# Patient Record
Sex: MALE | Race: WHITE | HISPANIC OR LATINO
[De-identification: names, ages, dates, MRNs, and addresses within clinical notes are randomized per-mention and may not be internally consistent; named-entity substitution may affect disease eponyms.]

---

## 2018-02-02 VITALS
HEART RATE: 77 BPM | DIASTOLIC BLOOD PRESSURE: 64 MMHG | HEIGHT: 76 IN | WEIGHT: 275.8 LBS | SYSTOLIC BLOOD PRESSURE: 124 MMHG | OXYGEN SATURATION: 96 % | RESPIRATION RATE: 18 BRPM | TEMPERATURE: 97 F

## 2018-02-02 NOTE — PATIENT PROFILE ADULT. - PMH
Hyperlipidemia  Hyperlipidemia  Hypokalemia  Hypokalemia  Jaundice  Jaundice  Liver mass    Malignant neoplasm of pancreas  obstructive  Obesity  Obesity  Type 2 diabetes mellitus  Diabetes  Vitamin D deficiency  Vitamin D deficiency Hyperlipidemia  Hyperlipidemia  Hypokalemia  Hypokalemia  Jaundice  Jaundice  Liver mass    Malignant neoplasm of pancreas  obstructive  Obesity  Obesity  Vitamin D deficiency  Vitamin D deficiency Hyperlipidemia  Hyperlipidemia  Hypokalemia  Hypokalemia  Jaundice  Jaundice  Liver mass    Malignant neoplasm of pancreas  obstructive  Obesity  Obesity  Type 2 diabetes mellitus    Vitamin D deficiency  Vitamin D deficiency

## 2018-02-05 ENCOUNTER — RESULT REVIEW (OUTPATIENT)
Age: 58
End: 2018-02-05

## 2018-02-05 ENCOUNTER — OUTPATIENT (OUTPATIENT)
Dept: INPATIENT UNIT | Facility: HOSPITAL | Age: 58
LOS: 1 days | Discharge: HOME | End: 2018-02-05
Payer: MEDICARE

## 2018-02-05 LAB
ALBUMIN SERPL ELPH-MCNC: 3.9 G/DL — SIGNIFICANT CHANGE UP (ref 3.3–5)
ALP SERPL-CCNC: 72 U/L — SIGNIFICANT CHANGE UP (ref 40–120)
ALT FLD-CCNC: 38 U/L — SIGNIFICANT CHANGE UP (ref 10–45)
ANION GAP SERPL CALC-SCNC: 11 MMOL/L — SIGNIFICANT CHANGE UP (ref 5–17)
AST SERPL-CCNC: 49 U/L — HIGH (ref 10–40)
BASE EXCESS BLDA CALC-SCNC: -2.8 MMOL/L — LOW (ref -2–3)
BILIRUB SERPL-MCNC: 0.6 MG/DL — SIGNIFICANT CHANGE UP (ref 0.2–1.2)
BUN SERPL-MCNC: 14 MG/DL — SIGNIFICANT CHANGE UP (ref 7–23)
CA-I BLDA-SCNC: 1.1 MMOL/L — LOW (ref 1.12–1.3)
CALCIUM SERPL-MCNC: 8.8 MG/DL — SIGNIFICANT CHANGE UP (ref 8.4–10.5)
CHLORIDE SERPL-SCNC: 101 MMOL/L — SIGNIFICANT CHANGE UP (ref 96–108)
CO2 SERPL-SCNC: 23 MMOL/L — SIGNIFICANT CHANGE UP (ref 22–31)
COHGB MFR BLDA: 0.3 % — SIGNIFICANT CHANGE UP
CREAT SERPL-MCNC: 0.99 MG/DL — SIGNIFICANT CHANGE UP (ref 0.5–1.3)
GAS PNL BLDA: SIGNIFICANT CHANGE UP
GLUCOSE BLDC GLUCOMTR-MCNC: 189 MG/DL — HIGH (ref 70–99)
GLUCOSE BLDC GLUCOMTR-MCNC: 212 MG/DL — HIGH (ref 70–99)
GLUCOSE BLDC GLUCOMTR-MCNC: 217 MG/DL — HIGH (ref 70–99)
GLUCOSE BLDC GLUCOMTR-MCNC: 226 MG/DL — HIGH (ref 70–99)
GLUCOSE SERPL-MCNC: 211 MG/DL — HIGH (ref 70–99)
HCO3 BLDA-SCNC: 22 MMOL/L — SIGNIFICANT CHANGE UP (ref 21–28)
HCT VFR BLD CALC: 40.4 % — SIGNIFICANT CHANGE UP (ref 39–50)
HGB BLD-MCNC: 13.8 G/DL — SIGNIFICANT CHANGE UP (ref 13–17)
HGB BLDA-MCNC: 14.8 G/DL — SIGNIFICANT CHANGE UP (ref 13–17)
MAGNESIUM SERPL-MCNC: 1.9 MG/DL — SIGNIFICANT CHANGE UP (ref 1.6–2.6)
MCHC RBC-ENTMCNC: 30.5 PG — SIGNIFICANT CHANGE UP (ref 27–34)
MCHC RBC-ENTMCNC: 34.2 G/DL — SIGNIFICANT CHANGE UP (ref 32–36)
MCV RBC AUTO: 89.2 FL — SIGNIFICANT CHANGE UP (ref 80–100)
METHGB MFR BLDA: 0.2 % — SIGNIFICANT CHANGE UP
O2 CT VFR BLDA CALC: SIGNIFICANT CHANGE UP (ref 15–23)
OXYHGB MFR BLDA: 99 % — SIGNIFICANT CHANGE UP (ref 94–100)
PCO2 BLDA: 40 MMHG — SIGNIFICANT CHANGE UP (ref 35–48)
PH BLDA: 7.36 — SIGNIFICANT CHANGE UP (ref 7.35–7.45)
PHOSPHATE SERPL-MCNC: 2.9 MG/DL — SIGNIFICANT CHANGE UP (ref 2.5–4.5)
PLATELET # BLD AUTO: 210 K/UL — SIGNIFICANT CHANGE UP (ref 150–400)
PO2 BLDA: 298 MMHG — HIGH (ref 83–108)
POTASSIUM BLDA-SCNC: 5.6 MMOL/L — HIGH (ref 3.5–4.9)
POTASSIUM SERPL-MCNC: 5 MMOL/L — SIGNIFICANT CHANGE UP (ref 3.5–5.3)
POTASSIUM SERPL-SCNC: 5 MMOL/L — SIGNIFICANT CHANGE UP (ref 3.5–5.3)
PROT SERPL-MCNC: 7.4 G/DL — SIGNIFICANT CHANGE UP (ref 6–8.3)
RBC # BLD: 4.53 M/UL — SIGNIFICANT CHANGE UP (ref 4.2–5.8)
RBC # FLD: 13 % — SIGNIFICANT CHANGE UP (ref 10.3–16.9)
SAO2 % BLDA: 100 % — SIGNIFICANT CHANGE UP (ref 95–100)
SODIUM BLDA-SCNC: 136 MMOL/L — LOW (ref 138–146)
SODIUM SERPL-SCNC: 135 MMOL/L — SIGNIFICANT CHANGE UP (ref 135–145)
WBC # BLD: 12.5 K/UL — HIGH (ref 3.8–10.5)
WBC # FLD AUTO: 12.5 K/UL — HIGH (ref 3.8–10.5)

## 2018-02-05 RX ORDER — GLUCAGON INJECTION, SOLUTION 0.5 MG/.1ML
1 INJECTION, SOLUTION SUBCUTANEOUS ONCE
Qty: 0 | Refills: 0 | Status: DISCONTINUED | OUTPATIENT
Start: 2018-02-05 | End: 2018-02-06

## 2018-02-05 RX ORDER — DEXTROSE 50 % IN WATER 50 %
25 SYRINGE (ML) INTRAVENOUS ONCE
Qty: 0 | Refills: 0 | Status: DISCONTINUED | OUTPATIENT
Start: 2018-02-05 | End: 2018-02-06

## 2018-02-05 RX ORDER — OXYCODONE AND ACETAMINOPHEN 5; 325 MG/1; MG/1
2 TABLET ORAL EVERY 6 HOURS
Qty: 0 | Refills: 0 | Status: DISCONTINUED | OUTPATIENT
Start: 2018-02-05 | End: 2018-02-06

## 2018-02-05 RX ORDER — SODIUM CHLORIDE 9 MG/ML
1000 INJECTION, SOLUTION INTRAVENOUS
Qty: 0 | Refills: 0 | Status: DISCONTINUED | OUTPATIENT
Start: 2018-02-05 | End: 2018-02-06

## 2018-02-05 RX ORDER — HYDROMORPHONE HYDROCHLORIDE 2 MG/ML
0.5 INJECTION INTRAMUSCULAR; INTRAVENOUS; SUBCUTANEOUS ONCE
Qty: 0 | Refills: 0 | Status: DISCONTINUED | OUTPATIENT
Start: 2018-02-05 | End: 2018-02-05

## 2018-02-05 RX ORDER — DEXTROSE 50 % IN WATER 50 %
1 SYRINGE (ML) INTRAVENOUS ONCE
Qty: 0 | Refills: 0 | Status: DISCONTINUED | OUTPATIENT
Start: 2018-02-05 | End: 2018-02-06

## 2018-02-05 RX ORDER — DEXTROSE 50 % IN WATER 50 %
12.5 SYRINGE (ML) INTRAVENOUS ONCE
Qty: 0 | Refills: 0 | Status: DISCONTINUED | OUTPATIENT
Start: 2018-02-05 | End: 2018-02-06

## 2018-02-05 RX ORDER — DOCUSATE SODIUM 100 MG
1 CAPSULE ORAL
Qty: 60 | Refills: 0 | OUTPATIENT
Start: 2018-02-05

## 2018-02-05 RX ORDER — ONDANSETRON 8 MG/1
4 TABLET, FILM COATED ORAL EVERY 6 HOURS
Qty: 0 | Refills: 0 | Status: DISCONTINUED | OUTPATIENT
Start: 2018-02-05 | End: 2018-02-06

## 2018-02-05 RX ORDER — OXYCODONE AND ACETAMINOPHEN 5; 325 MG/1; MG/1
1 TABLET ORAL EVERY 4 HOURS
Qty: 0 | Refills: 0 | Status: DISCONTINUED | OUTPATIENT
Start: 2018-02-05 | End: 2018-02-06

## 2018-02-05 RX ORDER — METOCLOPRAMIDE HCL 10 MG
10 TABLET ORAL ONCE
Qty: 0 | Refills: 0 | Status: COMPLETED | OUTPATIENT
Start: 2018-02-05 | End: 2018-02-05

## 2018-02-05 RX ORDER — INSULIN LISPRO 100/ML
VIAL (ML) SUBCUTANEOUS
Qty: 0 | Refills: 0 | Status: DISCONTINUED | OUTPATIENT
Start: 2018-02-05 | End: 2018-02-06

## 2018-02-05 RX ORDER — OXYCODONE HYDROCHLORIDE 5 MG/1
1 TABLET ORAL
Qty: 30 | Refills: 0
Start: 2018-02-05

## 2018-02-05 RX ADMIN — ONDANSETRON 4 MILLIGRAM(S): 8 TABLET, FILM COATED ORAL at 16:51

## 2018-02-05 RX ADMIN — Medication 10 MILLIGRAM(S): at 21:43

## 2018-02-05 RX ADMIN — Medication 2: at 22:55

## 2018-02-05 NOTE — PROGRESS NOTE ADULT - SUBJECTIVE AND OBJECTIVE BOX
POST-OPERATIVE NOTE    Procedure: Microwave ablation of liver tumor    Diagnosis/Indication: liver tumor    Surgeon: Dr. Lubin    S: Pt has no complaints. Denies CP, SOB, CARRANZA, calf tenderness. Pain controlled with medication. Denies nausea, vomiting.    O:  T(C): 36.4 (02-05-18 @ 18:20), Max: 36.4 (02-05-18 @ 18:20)  T(F): 97.5 (02-05-18 @ 18:20), Max: 97.5 (02-05-18 @ 18:20)  HR: 57 (02-05-18 @ 18:20) (56 - 98)  BP: 145/81 (02-05-18 @ 18:20) (139/73 - 172/87)  RR: 17 (02-05-18 @ 18:20) (12 - 17)  SpO2: 94% (02-05-18 @ 18:20) (93% - 97%)  Wt(kg): --                        13.8   12.5  )-----------( 210      ( 05 Feb 2018 15:06 )             40.4     02-05    135  |  101  |  14  ----------------------------<  211<H>  5.0   |  23  |  0.99    Ca    8.8      05 Feb 2018 15:06  Phos  2.9     02-05  Mg     1.9     02-05    TPro  7.4  /  Alb  3.9  /  TBili  0.6  /  DBili  x   /  AST  49<H>  /  ALT  38  /  AlkPhos  72  02-05      Gen: NAD, resting comfortably in bed  C/V: NSR  Pulm: Nonlabored breathing, no respiratory distress  Abd: soft, NT/ND. Incision site is clean, dry and intact.  Extrem: WWP, no calf edema or tenderness, SCDs in place      A/P: 57y Male s/p above procedure  Diet: reg  IVF:   Pain/nausea control  SQH/SCDs/OOBA/IS  Dispo pending pain control, PO tolerance, clinical improvement

## 2018-02-05 NOTE — BRIEF OPERATIVE NOTE - OPERATION/FINDINGS
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

## 2018-02-06 VITALS
RESPIRATION RATE: 17 BRPM | DIASTOLIC BLOOD PRESSURE: 70 MMHG | SYSTOLIC BLOOD PRESSURE: 113 MMHG | TEMPERATURE: 98 F | OXYGEN SATURATION: 95 % | HEART RATE: 75 BPM

## 2018-02-06 LAB
ALBUMIN SERPL ELPH-MCNC: 3.5 G/DL — SIGNIFICANT CHANGE UP (ref 3.3–5)
ALP SERPL-CCNC: 66 U/L — SIGNIFICANT CHANGE UP (ref 40–120)
ALT FLD-CCNC: 70 U/L — HIGH (ref 10–45)
ANION GAP SERPL CALC-SCNC: 12 MMOL/L — SIGNIFICANT CHANGE UP (ref 5–17)
AST SERPL-CCNC: 85 U/L — HIGH (ref 10–40)
BASOPHILS NFR BLD AUTO: 0.1 % — SIGNIFICANT CHANGE UP (ref 0–2)
BILIRUB SERPL-MCNC: 0.9 MG/DL — SIGNIFICANT CHANGE UP (ref 0.2–1.2)
BUN SERPL-MCNC: 13 MG/DL — SIGNIFICANT CHANGE UP (ref 7–23)
CALCIUM SERPL-MCNC: 8.9 MG/DL — SIGNIFICANT CHANGE UP (ref 8.4–10.5)
CHLORIDE SERPL-SCNC: 102 MMOL/L — SIGNIFICANT CHANGE UP (ref 96–108)
CO2 SERPL-SCNC: 24 MMOL/L — SIGNIFICANT CHANGE UP (ref 22–31)
CREAT SERPL-MCNC: 0.76 MG/DL — SIGNIFICANT CHANGE UP (ref 0.5–1.3)
EOSINOPHIL NFR BLD AUTO: 0.2 % — SIGNIFICANT CHANGE UP (ref 0–6)
GLUCOSE BLDC GLUCOMTR-MCNC: 161 MG/DL — HIGH (ref 70–99)
GLUCOSE BLDC GLUCOMTR-MCNC: 173 MG/DL — HIGH (ref 70–99)
GLUCOSE SERPL-MCNC: 169 MG/DL — HIGH (ref 70–99)
HBA1C BLD-MCNC: 8.5 % — HIGH (ref 4–5.6)
HCT VFR BLD CALC: 37.8 % — LOW (ref 39–50)
HGB BLD-MCNC: 12.9 G/DL — LOW (ref 13–17)
LYMPHOCYTES # BLD AUTO: 15 % — SIGNIFICANT CHANGE UP (ref 13–44)
MAGNESIUM SERPL-MCNC: 1.9 MG/DL — SIGNIFICANT CHANGE UP (ref 1.6–2.6)
MCHC RBC-ENTMCNC: 30.2 PG — SIGNIFICANT CHANGE UP (ref 27–34)
MCHC RBC-ENTMCNC: 34.1 G/DL — SIGNIFICANT CHANGE UP (ref 32–36)
MCV RBC AUTO: 88.5 FL — SIGNIFICANT CHANGE UP (ref 80–100)
MONOCYTES NFR BLD AUTO: 6.4 % — SIGNIFICANT CHANGE UP (ref 2–14)
NEUTROPHILS NFR BLD AUTO: 78.3 % — HIGH (ref 43–77)
PHOSPHATE SERPL-MCNC: 2.9 MG/DL — SIGNIFICANT CHANGE UP (ref 2.5–4.5)
PLATELET # BLD AUTO: 180 K/UL — SIGNIFICANT CHANGE UP (ref 150–400)
POTASSIUM SERPL-MCNC: 4 MMOL/L — SIGNIFICANT CHANGE UP (ref 3.5–5.3)
POTASSIUM SERPL-SCNC: 4 MMOL/L — SIGNIFICANT CHANGE UP (ref 3.5–5.3)
PROT SERPL-MCNC: 6.6 G/DL — SIGNIFICANT CHANGE UP (ref 6–8.3)
RBC # BLD: 4.27 M/UL — SIGNIFICANT CHANGE UP (ref 4.2–5.8)
RBC # FLD: 13.1 % — SIGNIFICANT CHANGE UP (ref 10.3–16.9)
SODIUM SERPL-SCNC: 138 MMOL/L — SIGNIFICANT CHANGE UP (ref 135–145)
WBC # BLD: 12.4 K/UL — HIGH (ref 3.8–10.5)
WBC # FLD AUTO: 12.4 K/UL — HIGH (ref 3.8–10.5)

## 2018-02-06 RX ADMIN — Medication 1: at 07:51

## 2018-02-06 NOTE — DISCHARGE NOTE ADULT - CARE PROVIDER_API CALL
Roberto Lubin), Surgery  [address] [place] [address]  Phone: [phone]  Fax: [phone]

## 2018-02-06 NOTE — DISCHARGE NOTE ADULT - MEDICATION SUMMARY - MEDICATIONS TO TAKE
I will START or STAY ON the medications listed below when I get home from the hospital:    acetaminophen-oxyCODONE 325 mg-5 mg oral tablet  -- 1 tab(s) by mouth every 6 hours, As Needed -for severe pain MDD:8  -- Caution federal law prohibits the transfer of this drug to any person other  than the person for whom it was prescribed.  May cause drowsiness.  Alcohol may intensify this effect.  Use care when operating dangerous machinery.  This prescription cannot be refilled.  This product contains acetaminophen.  Do not use  with any other product containing acetaminophen to prevent possible liver damage.  Using more of this medication than prescribed may cause serious breathing problems.    -- Indication: For Post operative pain     docusate sodium 100 mg oral tablet  -- 1 tab(s) by mouth 2 times a day, As Needed MDD:2   for constipation  -- Medication should be taken with plenty of water.    -- Indication: For Post operative constipation

## 2018-02-06 NOTE — DISCHARGE NOTE ADULT - PLAN OF CARE
Post operative care and follow up General Discharge Instructions:  Please resume all regular home medications unless specifically advised not to take a particular medication. Also, please take any new medications as prescribed.  Please get plenty of rest, continue to ambulate several times per day, and drink adequate amounts of fluids. Avoid lifting weights greater than 5-10 lbs until you follow-up with your surgeon, who will instruct you further regarding activity restrictions.  Avoid driving or operating heavy machinery while taking pain medications.  Please follow-up with your surgeon and Primary Care Provider (PCP) as advised.  Incision Care:  *Please call your doctor or nurse practitioner if you have increased pain, swelling, redness, or drainage from the incision site.  *Avoid swimming and baths until your follow-up appointment.  *You may shower, and wash surgical incisions with a mild soap and warm water. Gently pat the area dry.  *If you have staples, they will be removed at your follow-up appointment.  *If you have steri-strips, they will fall off on their own. Please remove any remaining strips 7-10 days after surgery. Post operative care Warning Signs:  Please call your doctor or nurse practitioner if you experience the following:  *You experience new chest pain, pressure, squeezing or tightness.  *New or worsening cough, shortness of breath, or wheeze.  *If you are vomiting and cannot keep down fluids or your medications.  *You are getting dehydrated due to continued vomiting, diarrhea, or other reasons. Signs of dehydration include dry mouth, rapid heartbeat, or feeling dizzy or faint when standing.  *You see blood or dark/black material when you vomit or have a bowel movement.  *You experience burning when you urinate, have blood in your urine, or experience a discharge.  *Your pain is not improving within 8-12 hours or is not gone within 24 hours. Call or return immediately if your pain is getting worse, changes location, or moves to your chest or back.  *You have shaking chills, or fever greater than 101.5 degrees Fahrenheit or 38 degrees Celsius.  *Any change in your symptoms, or any new symptoms that concern you.

## 2018-02-06 NOTE — PROGRESS NOTE ADULT - ASSESSMENT
56 yo M s/p henrry lap, DELORIS, liver biopsy, microwave ablation    -Pain/nausea control  -Diet reg, IV   -Percocet PO (pt states he has no true allergy to drug)  -SCDs, OBBA, IS  -AM labs

## 2018-02-06 NOTE — DISCHARGE NOTE ADULT - HOSPITAL COURSE
Patient presented to same day surgery for microwave ablation of liver mass. Patient underwent diagnostic laparoscopy, lysis of adhesions, liver biopsy, and microwave ablation of liver without acute events. His postoperative course was unremarkable with advancement of diet, passing trial of void, and pain control. On day of discharge patient was stable to be d/c'd home.

## 2018-02-06 NOTE — DISCHARGE NOTE ADULT - CARE PLAN
Principal Discharge DX:	Liver mass  Goal:	Post operative care and follow up  Assessment and plan of treatment:	General Discharge Instructions:  Please resume all regular home medications unless specifically advised not to take a particular medication. Also, please take any new medications as prescribed.  Please get plenty of rest, continue to ambulate several times per day, and drink adequate amounts of fluids. Avoid lifting weights greater than 5-10 lbs until you follow-up with your surgeon, who will instruct you further regarding activity restrictions.  Avoid driving or operating heavy machinery while taking pain medications.  Please follow-up with your surgeon and Primary Care Provider (PCP) as advised.  Incision Care:  *Please call your doctor or nurse practitioner if you have increased pain, swelling, redness, or drainage from the incision site.  *Avoid swimming and baths until your follow-up appointment.  *You may shower, and wash surgical incisions with a mild soap and warm water. Gently pat the area dry.  *If you have staples, they will be removed at your follow-up appointment.  *If you have steri-strips, they will fall off on their own. Please remove any remaining strips 7-10 days after surgery.  Goal:	Post operative care  Assessment and plan of treatment:	Warning Signs:  Please call your doctor or nurse practitioner if you experience the following:  *You experience new chest pain, pressure, squeezing or tightness.  *New or worsening cough, shortness of breath, or wheeze.  *If you are vomiting and cannot keep down fluids or your medications.  *You are getting dehydrated due to continued vomiting, diarrhea, or other reasons. Signs of dehydration include dry mouth, rapid heartbeat, or feeling dizzy or faint when standing.  *You see blood or dark/black material when you vomit or have a bowel movement.  *You experience burning when you urinate, have blood in your urine, or experience a discharge.  *Your pain is not improving within 8-12 hours or is not gone within 24 hours. Call or return immediately if your pain is getting worse, changes location, or moves to your chest or back.  *You have shaking chills, or fever greater than 101.5 degrees Fahrenheit or 38 degrees Celsius.  *Any change in your symptoms, or any new symptoms that concern you.

## 2018-02-06 NOTE — DISCHARGE NOTE ADULT - PATIENT PORTAL LINK FT
You can access the [facility] Patient Portal, offered by [facility], by registering with the following website: [email or web address]

## 2018-02-06 NOTE — PROGRESS NOTE ADULT - SUBJECTIVE AND OBJECTIVE BOX
O/N: passed TOV. VSs. BROOK  2/5: s/p microwave ablation, keeping 23 hour obs however pt may stay additional day. Pt has DM however, pt takes no metformin or insulin at home. Let's monitor finger sticks, may need baby dose of insulin sliding scale. POC wnl O/N: passed TOV. VSs. BROOK  2/5: s/p microwave ablation, keeping 23 hour obs however pt may stay additional day. Pt has DM however, pt takes no metformin or insulin at home. Let's monitor finger sticks, may need baby dose of insulin sliding scale. POC wnl  OVERNIGHT EVENTS:    STATUS POST:      POST OPERATIVE DAY #:     SUBJECTIVE:  Flatus: [] YES [] NO             Bowel Movement: [ ] YES [ ] NO  Pain (0-10):            Pain Control Adequate: [ ] YES [ ] NO  Nausea: [ ] YES [ ] NO            Vomiting: [ ] YES [ ] NO  Diarrhea: [ ] YES [ ] NO         Constipation: [ ] YES [ ] NO     Chest Pain: [ ] YES [ ] NO    SOB:  [ ] YES [ ] NO        Vital Signs Last 24 Hrs  T(C): 36.6 (06 Feb 2018 05:34), Max: 37.2 (05 Feb 2018 14:50)  T(F): 97.8 (06 Feb 2018 05:34), Max: 98.9 (05 Feb 2018 14:50)  HR: 75 (06 Feb 2018 05:34) (56 - 98)  BP: 113/70 (06 Feb 2018 05:34) (113/70 - 174/86)  BP(mean): 97 (05 Feb 2018 17:00) (92 - 130)  RR: 17 (06 Feb 2018 05:34) (12 - 17)  SpO2: 95% (06 Feb 2018 05:34) (93% - 98%)  I&O's Detail    05 Feb 2018 07:01  -  06 Feb 2018 07:00  --------------------------------------------------------  IN:    lactated ringers.: 1200 mL    Other: 1700 mL  Total IN: 2900 mL    OUT:    Voided: 875 mL  Total OUT: 875 mL    Total NET: 2025 mL          General: NAD, resting comfortably in bed  C/V: NSR  Pulm: Nonlabored breathing, no respiratory distress  Abd: soft, non distended , TTP around incision note , incision clean dry and intact   Extrem: WWP, no edema, SCDs in place        LABS:                        13.8   12.5  )-----------( 210      ( 05 Feb 2018 15:06 )             40.4     02-05    135  |  101  |  14  ----------------------------<  211<H>  5.0   |  23  |  0.99    Ca    8.8      05 Feb 2018 15:06  Phos  2.9     02-05  Mg     1.9     02-05    TPro  7.4  /  Alb  3.9  /  TBili  0.6  /  DBili  x   /  AST  49<H>  /  ALT  38  /  AlkPhos  72  02-05

## 2018-02-07 LAB — SURGICAL PATHOLOGY STUDY: SIGNIFICANT CHANGE UP

## 2018-02-07 PROCEDURE — 84132 ASSAY OF SERUM POTASSIUM: CPT

## 2018-02-07 PROCEDURE — 84100 ASSAY OF PHOSPHORUS: CPT

## 2018-02-07 PROCEDURE — 80053 COMPREHEN METABOLIC PANEL: CPT

## 2018-02-07 PROCEDURE — 83735 ASSAY OF MAGNESIUM: CPT

## 2018-02-07 PROCEDURE — 84295 ASSAY OF SERUM SODIUM: CPT

## 2018-02-07 PROCEDURE — 36415 COLL VENOUS BLD VENIPUNCTURE: CPT

## 2018-02-07 PROCEDURE — 82962 GLUCOSE BLOOD TEST: CPT

## 2018-02-07 PROCEDURE — 85027 COMPLETE CBC AUTOMATED: CPT

## 2018-02-07 PROCEDURE — 88307 TISSUE EXAM BY PATHOLOGIST: CPT

## 2018-02-07 PROCEDURE — 86900 BLOOD TYPING SEROLOGIC ABO: CPT

## 2018-02-07 PROCEDURE — C1889: CPT

## 2018-02-07 PROCEDURE — 86901 BLOOD TYPING SEROLOGIC RH(D): CPT

## 2018-02-07 PROCEDURE — 82330 ASSAY OF CALCIUM: CPT

## 2018-02-07 PROCEDURE — 85018 HEMOGLOBIN: CPT

## 2018-02-07 PROCEDURE — 88331 PATH CONSLTJ SURG 1 BLK 1SPC: CPT

## 2018-02-07 PROCEDURE — 47370 LAPARO ABLATE LIVER TUMOR RF: CPT

## 2018-02-07 PROCEDURE — 86850 RBC ANTIBODY SCREEN: CPT

## 2018-08-16 PROBLEM — R16.0 HEPATOMEGALY, NOT ELSEWHERE CLASSIFIED: Chronic | Status: ACTIVE | Noted: 2018-02-02

## 2018-08-16 PROBLEM — E11.9 TYPE 2 DIABETES MELLITUS WITHOUT COMPLICATIONS: Chronic | Status: ACTIVE | Noted: 2018-02-05

## 2018-08-22 NOTE — PATIENT PROFILE ADULT. - PSH
Elective surgery for purposes other than treating health conditions  billary sphinecterotomyof common bile duct stricture with splint placement  Whipple Procedure  Other postprocedural status  History of tonsillectomy Elective surgery for purposes other than treating health conditions  billary sphinecterotomyof common bile duct stricture with splint placement  Whipple Procedure  Other postprocedural status  History of tonsillectomy  Surgery, elective  liver ablation, feb 2018 Elective surgery for purposes other than treating health conditions  billary sphinecterotomy of common bile duct stricture with splint placement  Whipple Procedure  Other postprocedural status  History of tonsillectomy  Surgery, elective  liver ablation, feb 2018

## 2018-08-22 NOTE — PATIENT PROFILE ADULT. - PMH
Hyperlipidemia  Hyperlipidemia  Hypokalemia  Hypokalemia  Jaundice  Jaundice  Liver mass    Malignant neoplasm of pancreas  obstructive  Obesity  Obesity  Type 2 diabetes mellitus    Vitamin D deficiency  Vitamin D deficiency

## 2018-08-23 ENCOUNTER — INPATIENT (INPATIENT)
Facility: HOSPITAL | Age: 58
LOS: 0 days | Discharge: HOME | DRG: 406 | End: 2018-08-24
Attending: SURGERY | Admitting: SURGERY
Payer: COMMERCIAL

## 2018-08-23 ENCOUNTER — RESULT REVIEW (OUTPATIENT)
Age: 58
End: 2018-08-23

## 2018-08-23 VITALS
HEART RATE: 67 BPM | TEMPERATURE: 97 F | SYSTOLIC BLOOD PRESSURE: 118 MMHG | DIASTOLIC BLOOD PRESSURE: 59 MMHG | HEIGHT: 75 IN | RESPIRATION RATE: 16 BRPM | OXYGEN SATURATION: 96 % | WEIGHT: 257.28 LBS

## 2018-08-23 DIAGNOSIS — Z41.9 ENCOUNTER FOR PROCEDURE FOR PURPOSES OTHER THAN REMEDYING HEALTH STATE, UNSPECIFIED: Chronic | ICD-10-CM

## 2018-08-23 LAB
BASE EXCESS BLDA CALC-SCNC: -5.6 MMOL/L — LOW (ref -2–3)
CA-I BLDA-SCNC: 1.11 MMOL/L — LOW (ref 1.12–1.3)
COHGB MFR BLDA: 0.7 % — SIGNIFICANT CHANGE UP
GAS PNL BLDA: SIGNIFICANT CHANGE UP
GLUCOSE BLDC GLUCOMTR-MCNC: 231 MG/DL — HIGH (ref 70–99)
GLUCOSE BLDC GLUCOMTR-MCNC: 237 MG/DL — HIGH (ref 70–99)
GLUCOSE BLDC GLUCOMTR-MCNC: 243 MG/DL — HIGH (ref 70–99)
GLUCOSE BLDC GLUCOMTR-MCNC: 249 MG/DL — HIGH (ref 70–99)
GLUCOSE BLDC GLUCOMTR-MCNC: 286 MG/DL — HIGH (ref 70–99)
HCO3 BLDA-SCNC: 20 MMOL/L — LOW (ref 21–28)
HGB BLDA-MCNC: 13.7 G/DL — SIGNIFICANT CHANGE UP (ref 13–17)
METHGB MFR BLDA: 0.4 % — SIGNIFICANT CHANGE UP
O2 CT VFR BLDA CALC: 18.9 ML/DL — SIGNIFICANT CHANGE UP (ref 15–23)
OXYHGB MFR BLDA: 97 % — SIGNIFICANT CHANGE UP (ref 94–100)
PCO2 BLDA: 42 MMHG — SIGNIFICANT CHANGE UP (ref 35–48)
PH BLDA: 7.31 — LOW (ref 7.35–7.45)
PO2 BLDA: 129 MMHG — HIGH (ref 83–108)
POTASSIUM BLDA-SCNC: 4.8 MMOL/L — SIGNIFICANT CHANGE UP (ref 3.5–4.9)
SAO2 % BLDA: 98 % — SIGNIFICANT CHANGE UP (ref 95–100)
SODIUM BLDA-SCNC: 134 MMOL/L — LOW (ref 138–146)

## 2018-08-23 RX ORDER — DEXTROSE 50 % IN WATER 50 %
25 SYRINGE (ML) INTRAVENOUS ONCE
Qty: 0 | Refills: 0 | Status: DISCONTINUED | OUTPATIENT
Start: 2018-08-23 | End: 2018-08-24

## 2018-08-23 RX ORDER — INSULIN LISPRO 100/ML
VIAL (ML) SUBCUTANEOUS
Qty: 0 | Refills: 0 | Status: DISCONTINUED | OUTPATIENT
Start: 2018-08-23 | End: 2018-08-24

## 2018-08-23 RX ORDER — OXYCODONE AND ACETAMINOPHEN 5; 325 MG/1; MG/1
2 TABLET ORAL EVERY 6 HOURS
Qty: 0 | Refills: 0 | Status: DISCONTINUED | OUTPATIENT
Start: 2018-08-23 | End: 2018-08-24

## 2018-08-23 RX ORDER — ONDANSETRON 8 MG/1
4 TABLET, FILM COATED ORAL EVERY 6 HOURS
Qty: 0 | Refills: 0 | Status: DISCONTINUED | OUTPATIENT
Start: 2018-08-23 | End: 2018-08-24

## 2018-08-23 RX ORDER — METOCLOPRAMIDE HCL 10 MG
10 TABLET ORAL EVERY 6 HOURS
Qty: 0 | Refills: 0 | Status: DISCONTINUED | OUTPATIENT
Start: 2018-08-23 | End: 2018-08-24

## 2018-08-23 RX ORDER — OXYCODONE AND ACETAMINOPHEN 5; 325 MG/1; MG/1
1 TABLET ORAL EVERY 6 HOURS
Qty: 0 | Refills: 0 | Status: DISCONTINUED | OUTPATIENT
Start: 2018-08-23 | End: 2018-08-24

## 2018-08-23 RX ORDER — DEXTROSE 50 % IN WATER 50 %
12.5 SYRINGE (ML) INTRAVENOUS ONCE
Qty: 0 | Refills: 0 | Status: DISCONTINUED | OUTPATIENT
Start: 2018-08-23 | End: 2018-08-24

## 2018-08-23 RX ORDER — HEPARIN SODIUM 5000 [USP'U]/ML
5000 INJECTION INTRAVENOUS; SUBCUTANEOUS EVERY 8 HOURS
Qty: 0 | Refills: 0 | Status: DISCONTINUED | OUTPATIENT
Start: 2018-08-23 | End: 2018-08-24

## 2018-08-23 RX ORDER — SODIUM CHLORIDE 9 MG/ML
1000 INJECTION, SOLUTION INTRAVENOUS
Qty: 0 | Refills: 0 | Status: DISCONTINUED | OUTPATIENT
Start: 2018-08-23 | End: 2018-08-24

## 2018-08-23 RX ADMIN — Medication 4: at 18:41

## 2018-08-23 RX ADMIN — Medication 6: at 22:14

## 2018-08-23 RX ADMIN — SODIUM CHLORIDE 100 MILLILITER(S): 9 INJECTION, SOLUTION INTRAVENOUS at 14:34

## 2018-08-23 RX ADMIN — OXYCODONE AND ACETAMINOPHEN 1 TABLET(S): 5; 325 TABLET ORAL at 14:21

## 2018-08-23 RX ADMIN — OXYCODONE AND ACETAMINOPHEN 1 TABLET(S): 5; 325 TABLET ORAL at 20:53

## 2018-08-23 RX ADMIN — OXYCODONE AND ACETAMINOPHEN 1 TABLET(S): 5; 325 TABLET ORAL at 21:30

## 2018-08-23 RX ADMIN — Medication 4: at 11:28

## 2018-08-23 RX ADMIN — HEPARIN SODIUM 5000 UNIT(S): 5000 INJECTION INTRAVENOUS; SUBCUTANEOUS at 22:14

## 2018-08-23 RX ADMIN — OXYCODONE AND ACETAMINOPHEN 1 TABLET(S): 5; 325 TABLET ORAL at 13:31

## 2018-08-23 NOTE — H&P PST ADULT - PSH
Elective surgery for purposes other than treating health conditions  billary sphinecterotomy of common bile duct stricture with splint placement  Whipple Procedure  Other postprocedural status  History of tonsillectomy  Surgery, elective  liver ablation, feb 2018

## 2018-08-23 NOTE — BRIEF OPERATIVE NOTE - OPERATION/FINDINGS
Diagnostic laparoscopy, encountered a moderate amount of adhesions to the anterior abdominal wall in the superior abd. Subsequently adhesiolysis. Ultrasound guided liver biopsy of 1.5x1.5 cm liver mass. Microwave ablation of liver mass. Hemostasis achieved at the end of the case.

## 2018-08-23 NOTE — BRIEF OPERATIVE NOTE - PROCEDURE
<<-----Click on this checkbox to enter Procedure Microwave ablation of mass of liver  08/23/2018    Active  VY

## 2018-08-23 NOTE — H&P PST ADULT - ASSESSMENT
57M with DM and metastatic pancreatic cancer s/p whipple (2015) with metastasis to segment 4/5 of the liver s/p radio ablation and chemotherapy with recent recurrence here for an elective diagnostic laparoscopy, biopsy and microwave ablation.    - Will admit to surgical service post op, regional bed, team 2  - Diabetic diet  - Pain/nausea control PRN  - LR@100 until tolerating po  - AM labs  - SQH/OOBA/IS

## 2018-08-23 NOTE — PROGRESS NOTE ADULT - SUBJECTIVE AND OBJECTIVE BOX
Team 1 Surgery Post-Op Note, PCN:     Pre-Op Dx: liver ca  Procedure: Microwave ablation of mass of liver    Surgeon: Tristian    Subjective: pt seen at bedside, complaining of some ruq pain. denies nausea and vomiting      Vital Signs Last 24 Hrs  T(C): 36.7 (23 Aug 2018 13:00), Max: 36.7 (23 Aug 2018 13:00)  T(F): 98.1 (23 Aug 2018 13:00), Max: 98.1 (23 Aug 2018 13:00)  HR: 56 (23 Aug 2018 13:30) (52 - 78)  BP: 141/80 (23 Aug 2018 13:30) (118/59 - 143/81)  BP(mean): 101 (23 Aug 2018 13:30) (87 - 108)  RR: 17 (23 Aug 2018 13:30) (16 - 18)  SpO2: 98% (23 Aug 2018 13:30) (95% - 98%)    Physical Exam:  General: NAD, resting comfortably in bed  Pulmonary: Nonlabored breathing, no respiratory distress  Cardiovascular: NSR  Abdominal: soft, nondistended, moderate ruq tenderness, incision c/d/i  Extremities: WWP, normal strength  Pulses: palpable distal pulses      LABS:            CAPILLARY BLOOD GLUCOSE      POCT Blood Glucose.: 237 mg/dL (23 Aug 2018 11:18)  POCT Blood Glucose.: 249 mg/dL (23 Aug 2018 10:10)  POCT Blood Glucose.: 243 mg/dL (23 Aug 2018 07:21)        ABO Interpretation: B (08-23 @ 07:42)        Radiology and Additional Studies:    Assessment:57y Male s/p microwave ablation    Plan:  Pain/nausea control PRN  Home meds  Incentive spirometer/OOB/Ambulate  IVF, Diet: diabetic  AM labs

## 2018-08-23 NOTE — H&P PST ADULT - HISTORY OF PRESENT ILLNESS
57M with a hx of metastatic pancreatic cancer with hepatic metastasis, s/p Whipple procedure 3 years prior, chemotherapy (recently finished a 6mo course 3 weeks ago) and radioablation in February of 2018.  Pt initially had recession of tumor markers after ablation however recent imaging should recurrence of segment 4/5 mass.  He presents today for a diagnostic laparoscopy, liver biopsy and ablation of liver mass.  Pt recently had normal stress test and denies any recent fevers, chills, chest pain, sob, stallings, nausea, vomiting, diarrhea, dysuria.    PMH: Prediabetes, pancreatic cancer  Meds: currently not on any medication  All: Morphine --> pruritis  PSH: Whipple, liver ablation, appendectomy  Pt denies any history of DVT/PE

## 2018-08-24 ENCOUNTER — TRANSCRIPTION ENCOUNTER (OUTPATIENT)
Age: 58
End: 2018-08-24

## 2018-08-24 VITALS
SYSTOLIC BLOOD PRESSURE: 119 MMHG | OXYGEN SATURATION: 97 % | RESPIRATION RATE: 16 BRPM | DIASTOLIC BLOOD PRESSURE: 71 MMHG | TEMPERATURE: 97 F | HEART RATE: 89 BPM

## 2018-08-24 LAB
ALBUMIN SERPL ELPH-MCNC: 3.3 G/DL — SIGNIFICANT CHANGE UP (ref 3.3–5)
ALP SERPL-CCNC: 73 U/L — SIGNIFICANT CHANGE UP (ref 40–120)
ALT FLD-CCNC: 123 U/L — HIGH (ref 10–45)
ANION GAP SERPL CALC-SCNC: 11 MMOL/L — SIGNIFICANT CHANGE UP (ref 5–17)
AST SERPL-CCNC: 135 U/L — HIGH (ref 10–40)
BILIRUB SERPL-MCNC: 0.6 MG/DL — SIGNIFICANT CHANGE UP (ref 0.2–1.2)
BLD GP AB SCN SERPL QL: NEGATIVE — SIGNIFICANT CHANGE UP
BUN SERPL-MCNC: 13 MG/DL — SIGNIFICANT CHANGE UP (ref 7–23)
CALCIUM SERPL-MCNC: 9.2 MG/DL — SIGNIFICANT CHANGE UP (ref 8.4–10.5)
CHLORIDE SERPL-SCNC: 100 MMOL/L — SIGNIFICANT CHANGE UP (ref 96–108)
CO2 SERPL-SCNC: 24 MMOL/L — SIGNIFICANT CHANGE UP (ref 22–31)
CREAT SERPL-MCNC: 0.7 MG/DL — SIGNIFICANT CHANGE UP (ref 0.5–1.3)
GLUCOSE BLDC GLUCOMTR-MCNC: 211 MG/DL — HIGH (ref 70–99)
GLUCOSE SERPL-MCNC: 224 MG/DL — HIGH (ref 70–99)
HBA1C BLD-MCNC: 10.4 % — HIGH (ref 4–5.6)
HCT VFR BLD CALC: 38.5 % — LOW (ref 39–50)
HGB BLD-MCNC: 12.8 G/DL — LOW (ref 13–17)
MAGNESIUM SERPL-MCNC: 1.8 MG/DL — SIGNIFICANT CHANGE UP (ref 1.6–2.6)
MCHC RBC-ENTMCNC: 30.5 PG — SIGNIFICANT CHANGE UP (ref 27–34)
MCHC RBC-ENTMCNC: 33.2 G/DL — SIGNIFICANT CHANGE UP (ref 32–36)
MCV RBC AUTO: 91.7 FL — SIGNIFICANT CHANGE UP (ref 80–100)
PHOSPHATE SERPL-MCNC: 3 MG/DL — SIGNIFICANT CHANGE UP (ref 2.5–4.5)
PLATELET # BLD AUTO: 156 K/UL — SIGNIFICANT CHANGE UP (ref 150–400)
POTASSIUM SERPL-MCNC: 4 MMOL/L — SIGNIFICANT CHANGE UP (ref 3.5–5.3)
POTASSIUM SERPL-SCNC: 4 MMOL/L — SIGNIFICANT CHANGE UP (ref 3.5–5.3)
PROT SERPL-MCNC: 6.7 G/DL — SIGNIFICANT CHANGE UP (ref 6–8.3)
RBC # BLD: 4.2 M/UL — SIGNIFICANT CHANGE UP (ref 4.2–5.8)
RBC # FLD: 14.9 % — SIGNIFICANT CHANGE UP (ref 10.3–16.9)
RH IG SCN BLD-IMP: POSITIVE — SIGNIFICANT CHANGE UP
SODIUM SERPL-SCNC: 135 MMOL/L — SIGNIFICANT CHANGE UP (ref 135–145)
WBC # BLD: 13.7 K/UL — HIGH (ref 3.8–10.5)
WBC # FLD AUTO: 13.7 K/UL — HIGH (ref 3.8–10.5)

## 2018-08-24 PROCEDURE — 99222 1ST HOSP IP/OBS MODERATE 55: CPT | Mod: GC

## 2018-08-24 RX ORDER — ACETAMINOPHEN 500 MG
1 TABLET ORAL
Qty: 30 | Refills: 0
Start: 2018-08-24

## 2018-08-24 RX ORDER — METFORMIN HYDROCHLORIDE 850 MG/1
1 TABLET ORAL
Qty: 60 | Refills: 0
Start: 2018-08-24 | End: 2018-09-22

## 2018-08-24 RX ORDER — MAGNESIUM SULFATE 500 MG/ML
1 VIAL (ML) INJECTION ONCE
Qty: 0 | Refills: 0 | Status: COMPLETED | OUTPATIENT
Start: 2018-08-24 | End: 2018-08-24

## 2018-08-24 RX ADMIN — Medication 100 GRAM(S): at 13:09

## 2018-08-24 RX ADMIN — HEPARIN SODIUM 5000 UNIT(S): 5000 INJECTION INTRAVENOUS; SUBCUTANEOUS at 05:33

## 2018-08-24 RX ADMIN — Medication 4: at 08:23

## 2018-08-24 NOTE — DISCHARGE NOTE ADULT - PROVIDER TOKENS
TOKEN:'[id number]',FREE:[LAST:[[facility]],PHONE:[phone]],FAX:[(   )    -]]

## 2018-08-24 NOTE — PROGRESS NOTE ADULT - SUBJECTIVE AND OBJECTIVE BOX
SUBJECTIVE: Patient appears comfortable with no complaints. Pain is well controlled. Tolerating regular diet well, denies N/V. Voiding and ambulating appropriately. Hyperglycemia overnight, FS elevated in 200s and HgA1c 10.4.    heparin  Injectable 5000 Unit(s) SubCutaneous every 8 hours      Vital Signs Last 24 Hrs  T(C): 37 (24 Aug 2018 05:27), Max: 37 (24 Aug 2018 05:27)  T(F): 98.6 (24 Aug 2018 05:27), Max: 98.6 (24 Aug 2018 05:27)  HR: 60 (24 Aug 2018 05:27) (50 - 80)  BP: 110/66 (24 Aug 2018 05:27) (109/70 - 143/81)  BP(mean): 86 (23 Aug 2018 18:00) (86 - 109)  RR: 18 (24 Aug 2018 05:27) (15 - 18)  SpO2: 94% (24 Aug 2018 05:27) (93% - 98%)    General: NAD, resting comfortably in bed  Pulm: Nonlabored breathing, no respiratory distress  Abd: soft, appropriately tender, nondistended. Inc c/d/i  Extrem: WWP, no edema, SCDs in place      LABS:                        12.8   13.7  )-----------( 156      ( 24 Aug 2018 08:18 )             38.5

## 2018-08-24 NOTE — DISCHARGE NOTE ADULT - PATIENT PORTAL LINK FT
You can access the [facility], offered by [facility], by registering with the following website: [email or web address]

## 2018-08-24 NOTE — DISCHARGE NOTE ADULT - CARE PLAN
Principal Discharge DX:	Malignant neoplasm of pancreas  Goal:	Recovery  Assessment and plan of treatment:	Follow up with Dr. Lubin in 1-2 weeks. Call the office at the number below to schedule your appointment. You may shower; soap and water over incision sites. Do not scrub. Pat dry when done. No tub bathing or swimming until cleared. Keep incision sites out of the sun as scars will darken. Ambulate as tolerated, but no heavy lifting (>10lbs) or strenuous exercise. You may resume regular diet. You should be urinating at least 3-4x per day. Call the office if you experience increasing abdominal pain, nausea, vomiting, or temperature >101 F.  Secondary Diagnosis:	Surgery, elective

## 2018-08-24 NOTE — DISCHARGE NOTE ADULT - CARE PROVIDER_API CALL
Roberto Lubin), Surgery  [address] [place] [address]  Phone: [phone]  Fax: [phone]    [facility],   Phone: [phone]  Fax: (       -

## 2018-08-24 NOTE — PROGRESS NOTE ADULT - ASSESSMENT
57M with DM and metastatic pancreatic cancer s/p whipple (2015) with metastasis to segment 4/5 of the liver s/p radio ablation and chemotherapy with recent recurrence POD1 s/p elective diagnostic laparoscopy, biopsy and microwave ablation.    - Diabetic diet  - Pain/nausea control PRN  - AM labs  - SQH/OOBA/IS  - F/U endocrine consult this AM for elevated FS and HgA1c  -Dispo: Plan is to DC home today pending Endocrine recommendations

## 2018-08-24 NOTE — DISCHARGE NOTE ADULT - HOSPITAL COURSE
The patient is a 57M with a hx of metastatic pancreatic cancer with hepatic metastasis, s/p Whipple procedure 3 years prior, chemotherapy (recently finished a 6mo course 3 weeks ago) and radioablation in February of 2018.  Pt initially had recession of tumor markers after ablation however recent imaging should recurrence of segment 4/5 mass.  He presented on day of admission for a diagnostic laparoscopy, liver biopsy and ablation of liver mass.  Procedure was uncomplicated and the patient tolerated well. Endocrinology consulted for recommendations of hypergylcemia. His postoperative course was unremarkable with advancement of diet, passing trial of void, and pain control. On day of discharge patient was stable to be discharged home.

## 2018-08-24 NOTE — DISCHARGE NOTE ADULT - PLAN OF CARE
Recovery Follow up with Dr. Lubin in 1-2 weeks. Call the office at the number below to schedule your appointment. You may shower; soap and water over incision sites. Do not scrub. Pat dry when done. No tub bathing or swimming until cleared. Keep incision sites out of the sun as scars will darken. Ambulate as tolerated, but no heavy lifting (>10lbs) or strenuous exercise. You may resume regular diet. You should be urinating at least 3-4x per day. Call the office if you experience increasing abdominal pain, nausea, vomiting, or temperature >101 F.

## 2018-08-24 NOTE — DISCHARGE NOTE ADULT - MEDICATION SUMMARY - MEDICATIONS TO TAKE
I will START or STAY ON the medications listed below when I get home from the hospital:  None I will START or STAY ON the medications listed below when I get home from the hospital:    Lancet  -- Apply on skin to affected area 4 times a day   -- Indication: For Diabetes    Glucose monitoring strip  -- 1 unit(s) by percutaneous administration 4 times a day   -- Indication: For Diabetes    Tylenol 325 mg oral capsule  -- 1 cap(s) by mouth every 6 hours, As Needed -for severe pain MDD:4 tabs daily  -- Indication: For Pain    Fortamet 500 mg oral tablet, extended release  -- 1 tab(s) by mouth 2 times a day MDD:4 times daily  -- Check with your doctor before becoming pregnant.  Do not drink alcoholic beverages when taking this medication.  Obtain medical advice before taking any non-prescription drugs as some may affect the action of this medication.  Swallow whole.  Do not crush.  Take with food or milk.    -- Indication: For Diabetes

## 2018-08-24 NOTE — CONSULT NOTE ADULT - SUBJECTIVE AND OBJECTIVE BOX
HPI:57M with a hx of metastatic pancreatic cancer with hepatic metastasis, s/p Whipple procedure 3 years prior, chemotherapy (recently finished a 6mo course 3 weeks ago) and radioablation in February of 2018.  Pt initially had recession of tumor markers after ablation however recent imaging should recurrence of segment 4/5 mass.  He presents today for a diagnostic laparoscopy, liver biopsy and ablation of liver mass.  Pt recently had normal stress test and denies any recent fevers, chills, chest pain, sob, stallings, nausea, vomiting, diarrhea, dysuria.  57M with DM and metastatic pancreatic cancer s/p whipple (2015) with metastasis to segment 4/5 of the liver s/p radio ablation and chemotherapy with recent recurrence here for an elective diagnostic laparoscopy, biopsy and microwave ablation.  Diagnostic laparoscopy, encountered a moderate amount of adhesions to the anterior abdominal wall in the superior abd. Subsequently adhesiolysis. Ultrasound guided liver biopsy of 1.5x1.5 cm liver mass. Microwave ablation of liver mass. Hemostasis achieved at the end of the case.	      Age at Dx:  How dx:  Hx and duration of insulin:  Current Therapy:  Hx of hypoglycemia  Hx of DKA/HHS?    Home FSG:  Fasting  Lunch  Dinner  Bed    Hx of other regimens  Complications:  Outpatient Endo:    PMH & Surgical Hx:J02.9 C25.9  Handoff  MEWS Score  Type 2 diabetes mellitus  Liver mass  Hypokalemia  Type 2 diabetes mellitus  Malignant neoplasm of pancreas  Jaundice  Obesity  Hyperlipidemia  Vitamin D deficiency  Metastasis from pancreatic cancer  Metastasis from pancreatic cancer  Microwave ablation of mass of liver  Surgery, elective  Elective surgery for purposes other than treating health conditions  Other postprocedural status      FH:  DM:  Thyroid:  Autoimmune:  Other:    SH:  Smoking  Etoh:  Recreational Drugs:  Social Life:    Current Meds:  dextrose 50% Injectable 12.5 Gram(s) IV Push once  dextrose 50% Injectable 25 Gram(s) IV Push once  dextrose 50% Injectable 25 Gram(s) IV Push once  heparin  Injectable 5000 Unit(s) SubCutaneous every 8 hours  insulin lispro (HumaLOG) corrective regimen sliding scale   SubCutaneous four times a day before meals  magnesium sulfate  IVPB 1 Gram(s) IV Intermittent once  metoclopramide Injectable 10 milliGRAM(s) IV Push every 6 hours PRN  ondansetron Injectable 4 milliGRAM(s) IV Push every 6 hours PRN  oxyCODONE    5 mG/acetaminophen 325 mG 1 Tablet(s) Oral every 6 hours PRN  oxyCODONE    5 mG/acetaminophen 325 mG 2 Tablet(s) Oral every 6 hours PRN      Allergies:  morphine (Hives)      ROS:  Denies the following except as indicated.    General: weight loss/weight gain, decreased appetite, fatigue  Eyes: Blurry vision, double vision, visual changes  ENT: Throat pain, changes in voice,   CV: palpitations, SOB, CP, cough  GI: NVD, difficulty swallowing, abdominal pain  : polyuria, dysuria  Endo: abnormal menses, temperature intolerance, decreased libido  MSK: weakness, joint pain  Skin: rash, dryness, diaphoresis  Heme: Easy bruising,bleeding  Neuro: HA, dizziness, lightheadedness, numbness tingling  Psych: Anxiety, Depression    Vital Signs Last 24 Hrs  T(C): 36.6 (24 Aug 2018 09:47), Max: 37 (24 Aug 2018 05:27)  T(F): 97.8 (24 Aug 2018 09:47), Max: 98.6 (24 Aug 2018 05:27)  HR: 90 (24 Aug 2018 09:47) (50 - 90)  BP: 114/70 (24 Aug 2018 09:47) (109/70 - 141/80)  BP(mean): 86 (23 Aug 2018 18:00) (86 - 109)  RR: 16 (24 Aug 2018 09:47) (15 - 18)  SpO2: 95% (24 Aug 2018 09:47) (93% - 98%)  Height (cm): 190.5 (08-23 @ 06:58)  Weight (kg): 116.7 (08-23 @ 06:58)  BMI (kg/m2): 32.2 (08-23 @ 06:58)      Constitutional: wn/wd in NAD.   HEENT: NCAT, MMM, OP clear, EOMI, , no proptosis or lid retraction  Neck: no thyromegaly or palpable thyroid nodules   Respiratory: lungs CTAB.  Cardiovascular: regular rhythm, normal S1 and S2, no audible murmurs, no peripheral edema  GI: soft, NT/ND, no masses/HSM appreciated.  Neurology: no tremors, DTR 2+  Skin: no visible rashes/lesions  Psychiatric: AAO x 3, normal affect/mood.  Ext: radial pulses intact, DP pulses intact, extremities warm, no cyanosis, clubbing or edema.       LABS:                        12.8   13.7  )-----------( 156      ( 24 Aug 2018 08:18 )             38.5     08-24    135  |  100  |  13  ----------------------------<  224<H>  4.0   |  24  |  0.70    Ca    9.2      24 Aug 2018 08:18  Phos  3.0     08-24  Mg     1.8     08-24    TPro  6.7  /  Alb  3.3  /  TBili  0.6  /  DBili  x   /  AST  135<H>  /  ALT  123<H>  /  AlkPhos  73  08-24        Hemoglobin A1C, Whole Blood: 10.4 (08-24 @ 08:19)        RADIOLOGY & ADDITIONAL STUDIES:  CAPILLARY BLOOD GLUCOSE      POCT Blood Glucose.: 211 mg/dL (24 Aug 2018 08:08)  POCT Blood Glucose.: 286 mg/dL (23 Aug 2018 22:05)  POCT Blood Glucose.: 231 mg/dL (23 Aug 2018 18:34)        A/P:57y Male    1.  DM  Please continue lantus       units at night / morning.  Please continue lispro      units before each meal.  Please continue lispro moderate / low dose sliding scale four times daily with meals and at bedtime    Pt's fingerstick glucose goal is     Will continue to monitor     For discharge, pt can continue    Pt can follow up at discharge with [facility] by calling  to make an appointment.   Will discuss case with     and update primary team HPI: 57M with a hx of metastatic pancreatic cancer with hepatic metastasis, s/p Whipple procedure 3 years prior, chemotherapy (recently finished a 6mo course 3 weeks ago) and radioablation in February of 2018.  Pt initially had recession of tumor markers after ablation however recent imaging should recurrence of segment 4/5 mass, and is now s/p diagnostic laparoscopy, liver biopsy and ablation of liver mass POD #1. Per pt he has been persistently hyperglycemic for the past 6 months due to steroids given while on chemo, and has not been treated. No family history of diabetes. A1C 10.4%. Pt denies polyuria, polydipsia, blurry vision, neuropathy. Weight is stable. Pt c/o mild pain to abdominal site. In the hospital pt has been in the 200s on a lispro MISS.       PMH & Surgical Hx:J02.9 C25.9  Handoff  MEWS Score  Type 2 diabetes mellitus  Liver mass  Hypokalemia  Type 2 diabetes mellitus  Malignant neoplasm of pancreas  Jaundice  Obesity  Hyperlipidemia  Vitamin D deficiency  Metastasis from pancreatic cancer  Metastasis from pancreatic cancer  Microwave ablation of mass of liver  Surgery, elective  Elective surgery for purposes other than treating health conditions  Other postprocedural status    SH:  Smoking: former  Etoh: rare  Recreational Drugs: denies  [place]    Current Meds:  dextrose 50% Injectable 12.5 Gram(s) IV Push once  dextrose 50% Injectable 25 Gram(s) IV Push once  dextrose 50% Injectable 25 Gram(s) IV Push once  heparin  Injectable 5000 Unit(s) SubCutaneous every 8 hours  insulin lispro (HumaLOG) corrective regimen sliding scale   SubCutaneous four times a day before meals  magnesium sulfate  IVPB 1 Gram(s) IV Intermittent once  metoclopramide Injectable 10 milliGRAM(s) IV Push every 6 hours PRN  ondansetron Injectable 4 milliGRAM(s) IV Push every 6 hours PRN  oxyCODONE    5 mG/acetaminophen 325 mG 1 Tablet(s) Oral every 6 hours PRN  oxyCODONE    5 mG/acetaminophen 325 mG 2 Tablet(s) Oral every 6 hours PRN      Allergies:  morphine (Hives)    ROS:  Denies the following except as indicated.    General: weight loss/weight gain, decreased appetite, fatigue  Eyes: Blurry vision, double vision, visual changes  ENT: Throat pain, changes in voice,   CV: palpitations, SOB, CP, cough  GI: NVD, difficulty swallowing, abdominal pain  : polyuria, dysuria  Endo: abnormal menses, temperature intolerance, decreased libido  MSK: weakness, joint pain  Skin: rash, dryness, diaphoresis  Heme: Easy bruising, bleeding  Neuro: HA, dizziness, lightheadedness, numbness tingling  Psych: Anxiety, Depression    Vital Signs Last 24 Hrs  T(C): 36.6 (24 Aug 2018 09:47), Max: 37 (24 Aug 2018 05:27)  T(F): 97.8 (24 Aug 2018 09:47), Max: 98.6 (24 Aug 2018 05:27)  HR: 90 (24 Aug 2018 09:47) (50 - 90)  BP: 114/70 (24 Aug 2018 09:47) (109/70 - 141/80)  BP(mean): 86 (23 Aug 2018 18:00) (86 - 109)  RR: 16 (24 Aug 2018 09:47) (15 - 18)  SpO2: 95% (24 Aug 2018 09:47) (93% - 98%)  Height (cm): 190.5 (08-23 @ 06:58)  Weight (kg): 116.7 (08-23 @ 06:58)  BMI (kg/m2): 32.2 (08-23 @ 06:58)      Constitutional: wn/wd in NAD.   HEENT: NCAT, MMM, OP clear, EOMI, , no proptosis or lid retraction  Neck: no thyromegaly or palpable thyroid nodules   Respiratory: lungs CTAB.  Cardiovascular: regular rhythm, normal S1 and S2, no audible murmurs, no peripheral edema  GI: soft, NT/ND, no masses/HSM appreciated.  Neurology: no tremors, DTR 2+  Skin: no visible rashes/lesions  Psychiatric: AAO x 3, normal affect/mood.  Ext: radial pulses intact, DP pulses intact, extremities warm, no cyanosis, clubbing or edema.       LABS:                        12.8   13.7  )-----------( 156      ( 24 Aug 2018 08:18 )             38.5     08-24    135  |  100  |  13  ----------------------------<  224<H>  4.0   |  24  |  0.70    Ca    9.2      24 Aug 2018 08:18  Phos  3.0     08-24  Mg     1.8     08-24    TPro  6.7  /  Alb  3.3  /  TBili  0.6  /  DBili  x   /  AST  135<H>  /  ALT  123<H>  /  AlkPhos  73  08-24        Hemoglobin A1C, Whole Blood: 10.4 (08-24 @ 08:19)      CAPILLARY BLOOD GLUCOSE  POCT Blood Glucose.: 211 mg/dL (24 Aug 2018 08:08)  POCT Blood Glucose.: 286 mg/dL (23 Aug 2018 22:05)  POCT Blood Glucose.: 231 mg/dL (23 Aug 2018 18:34)    A/P: 57M with a hx of metastatic pancreatic cancer with hepatic metastasis now s/p diagnostic laparoscopy, liver biopsy and ablation of liver mass POD #1 with steroid induced hyperglycemia.    1.  DM--steroid induced  For discharge pt can have metformin XR 500mg BID. He has been educated on glucometer and will check FSG at home. Pt can follow up with endocrinology where he lives.   Pt's fingerstick glucose goal is 100-180.    Will continue to monitor.    Pt can follow up at discharge with [facility] by calling  to make an appointment.   Will discuss case with Dr. Juarez and update primary team

## 2018-08-27 LAB — SURGICAL PATHOLOGY STUDY: SIGNIFICANT CHANGE UP

## 2018-08-27 PROCEDURE — 85018 HEMOGLOBIN: CPT

## 2018-08-27 PROCEDURE — 83735 ASSAY OF MAGNESIUM: CPT

## 2018-08-27 PROCEDURE — 85027 COMPLETE CBC AUTOMATED: CPT

## 2018-08-27 PROCEDURE — 84132 ASSAY OF SERUM POTASSIUM: CPT

## 2018-08-27 PROCEDURE — C9399: CPT

## 2018-08-27 PROCEDURE — 84100 ASSAY OF PHOSPHORUS: CPT

## 2018-08-27 PROCEDURE — 82962 GLUCOSE BLOOD TEST: CPT

## 2018-08-27 PROCEDURE — 36415 COLL VENOUS BLD VENIPUNCTURE: CPT

## 2018-08-27 PROCEDURE — 47370 LAPARO ABLATE LIVER TUMOR RF: CPT

## 2018-08-27 PROCEDURE — 86901 BLOOD TYPING SEROLOGIC RH(D): CPT

## 2018-08-27 PROCEDURE — 88307 TISSUE EXAM BY PATHOLOGIST: CPT

## 2018-08-27 PROCEDURE — 84295 ASSAY OF SERUM SODIUM: CPT

## 2018-08-27 PROCEDURE — 80053 COMPREHEN METABOLIC PANEL: CPT

## 2018-08-27 PROCEDURE — 86850 RBC ANTIBODY SCREEN: CPT

## 2018-08-27 PROCEDURE — 82330 ASSAY OF CALCIUM: CPT

## 2018-08-27 PROCEDURE — 47001 NDL BIOPSY LVR TM OTH MAJ PX: CPT

## 2018-08-27 PROCEDURE — 83036 HEMOGLOBIN GLYCOSYLATED A1C: CPT

## 2018-08-27 PROCEDURE — 86900 BLOOD TYPING SEROLOGIC ABO: CPT

## 2018-08-29 DIAGNOSIS — E09.9 DRUG OR CHEMICAL INDUCED DIABETES MELLITUS WITHOUT COMPLICATIONS: ICD-10-CM

## 2018-08-29 DIAGNOSIS — C78.7 SECONDARY MALIGNANT NEOPLASM OF LIVER AND INTRAHEPATIC BILE DUCT: ICD-10-CM

## 2018-08-29 DIAGNOSIS — E11.9 TYPE 2 DIABETES MELLITUS WITHOUT COMPLICATIONS: ICD-10-CM

## 2018-08-29 DIAGNOSIS — K66.0 PERITONEAL ADHESIONS (POSTPROCEDURAL) (POSTINFECTION): ICD-10-CM

## 2018-08-29 DIAGNOSIS — C25.9 MALIGNANT NEOPLASM OF PANCREAS, UNSPECIFIED: ICD-10-CM

## 2018-08-29 DIAGNOSIS — E66.9 OBESITY, UNSPECIFIED: ICD-10-CM

## 2018-08-29 DIAGNOSIS — T38.0X5A ADVERSE EFFECT OF GLUCOCORTICOIDS AND SYNTHETIC ANALOGUES, INITIAL ENCOUNTER: ICD-10-CM

## 2018-08-29 DIAGNOSIS — E78.5 HYPERLIPIDEMIA, UNSPECIFIED: ICD-10-CM

## 2018-08-29 DIAGNOSIS — E55.9 VITAMIN D DEFICIENCY, UNSPECIFIED: ICD-10-CM

## 2020-01-16 NOTE — PATIENT PROFILE ADULT. - PAIN SCALE PREFERRED, PROFILE
[address]  Occupational Therapy  Daily Note  Time:    Time In: 3435  Time Out: 1515  Timed Code Treatment Minutes: 40 Minutes  Minutes: 40          Date: 2020  Patient Name: Gina Hubbard,   Gender: male      Room: [facility]56/056-A  MRN: [id number]  : 1941  (77 y.o.)  Referring Practitioner: Dr. Angelita Rice   Diagnosis: CVA   Additional Pertinent Hx: Per ER note on 19:  77 y.o. male who presents to the Emergency Department via EMS for the evaluation of a CVA. The patient was found on the floor sitting up around 0630 this morning by his wife, who heard him fall. She reports a drooping mouth on the right side, slurred speech, and right sided weakness when she found him at 0630. The patient's last known well was 22:00 last night. The patient is unable to walk because his left leg is weak, and the patient had to be lifted into a chair after he was found on the floor. MRI: Acute infarct in the left hemipons on 19; s/p TPA. Restrictions/Precautions:  Restrictions/Precautions: General Precautions, Fall Risk, Modified Diet  Position Activity Restriction  Other position/activity restrictions: pt pushes to right, R romain, Rt neglect, poor sensation Rt hemibody    SUBJECTIVE: cooperative, alert, spouse present and supportive,     PAIN: 0/10:  Denied pain    COGNITION: Slow Processing    ADL:   No ADL's completed this session. Clarene Search BALANCE:  Sitting Balance:  Supervision. BED MOBILITY:  Supine to Sit: Minimal Assistance from EOM,  Sit to Supine: Moderate Assistance into bed. TRANSFERS:  Squat Pivot: Moderate Assistance. ADDITIONAL ACTIVITIES:  SPLINT/SUPPORT FABRICATED  Right UPPER EXTREMITY  8504 - WHFO, rigid without joints, may include soft interface material, straps, custom fabricated, includes fitting and adjustment    SPLINT/BRACE FIT PROPERLY:  Yes returned 1/2 hour after session for checking for reddened areas.  No concerns to ipmrove indep with self care. Long term goal 2: pt will complete stand pivot transfer to [facility] with CGA to improve indep with toileting. Long term goal 3: Pt will demonstrate 3/5 R elbow flexion to improve indep with donning a shirt. Following session, patient left in safe position with all fall risk precautions in place. none

## 2021-02-25 ENCOUNTER — TRANSCRIPTION ENCOUNTER (OUTPATIENT)
Age: 61
End: 2021-02-25

## 2021-02-25 VITALS
HEART RATE: 106 BPM | OXYGEN SATURATION: 98 % | DIASTOLIC BLOOD PRESSURE: 72 MMHG | HEIGHT: 75 IN | TEMPERATURE: 98 F | WEIGHT: 212.75 LBS | RESPIRATION RATE: 16 BRPM | SYSTOLIC BLOOD PRESSURE: 124 MMHG

## 2021-02-26 ENCOUNTER — INPATIENT (INPATIENT)
Facility: HOSPITAL | Age: 61
LOS: 0 days | Discharge: HOME | DRG: 406 | End: 2021-02-27
Attending: SURGERY | Admitting: SURGERY
Payer: MEDICARE

## 2021-02-26 ENCOUNTER — RESULT REVIEW (OUTPATIENT)
Age: 61
End: 2021-02-26

## 2021-02-26 DIAGNOSIS — Z98.890 OTHER SPECIFIED POSTPROCEDURAL STATES: Chronic | ICD-10-CM

## 2021-02-26 DIAGNOSIS — Z41.9 ENCOUNTER FOR PROCEDURE FOR PURPOSES OTHER THAN REMEDYING HEALTH STATE, UNSPECIFIED: Chronic | ICD-10-CM

## 2021-02-26 LAB
GLUCOSE BLDC GLUCOMTR-MCNC: 173 MG/DL — HIGH (ref 70–99)
GLUCOSE BLDC GLUCOMTR-MCNC: 259 MG/DL — HIGH (ref 70–99)
GLUCOSE BLDC GLUCOMTR-MCNC: 342 MG/DL — HIGH (ref 70–99)
GLUCOSE BLDC GLUCOMTR-MCNC: 432 MG/DL — HIGH (ref 70–99)
GLUCOSE BLDC GLUCOMTR-MCNC: 493 MG/DL — CRITICAL HIGH (ref 70–99)

## 2021-02-26 PROCEDURE — 88344 IMHCHEM/IMCYTCHM EA MLT ANTB: CPT | Mod: 26

## 2021-02-26 PROCEDURE — 88341 IMHCHEM/IMCYTCHM EA ADD ANTB: CPT | Mod: 26,59

## 2021-02-26 PROCEDURE — 88307 TISSUE EXAM BY PATHOLOGIST: CPT | Mod: 26

## 2021-02-26 PROCEDURE — 88342 IMHCHEM/IMCYTCHM 1ST ANTB: CPT | Mod: 26,59

## 2021-02-26 RX ORDER — DIPHENHYDRAMINE HCL 50 MG
12.5 CAPSULE ORAL ONCE
Refills: 0 | Status: COMPLETED | OUTPATIENT
Start: 2021-02-26 | End: 2021-02-26

## 2021-02-26 RX ORDER — SODIUM CHLORIDE 9 MG/ML
1000 INJECTION, SOLUTION INTRAVENOUS
Refills: 0 | Status: DISCONTINUED | OUTPATIENT
Start: 2021-02-26 | End: 2021-02-27

## 2021-02-26 RX ORDER — GLUCAGON INJECTION, SOLUTION 0.5 MG/.1ML
1 INJECTION, SOLUTION SUBCUTANEOUS ONCE
Refills: 0 | Status: DISCONTINUED | OUTPATIENT
Start: 2021-02-26 | End: 2021-02-27

## 2021-02-26 RX ORDER — ONDANSETRON 8 MG/1
4 TABLET, FILM COATED ORAL EVERY 6 HOURS
Refills: 0 | Status: DISCONTINUED | OUTPATIENT
Start: 2021-02-26 | End: 2021-02-27

## 2021-02-26 RX ORDER — PREGABALIN 225 MG/1
1 CAPSULE ORAL
Qty: 0 | Refills: 0 | DISCHARGE

## 2021-02-26 RX ORDER — OXYCODONE HYDROCHLORIDE 5 MG/1
5 TABLET ORAL EVERY 6 HOURS
Refills: 0 | Status: DISCONTINUED | OUTPATIENT
Start: 2021-02-26 | End: 2021-02-27

## 2021-02-26 RX ORDER — INSULIN LISPRO 100/ML
VIAL (ML) SUBCUTANEOUS
Refills: 0 | Status: DISCONTINUED | OUTPATIENT
Start: 2021-02-26 | End: 2021-02-27

## 2021-02-26 RX ORDER — BUPIVACAINE 13.3 MG/ML
20 INJECTION, SUSPENSION, LIPOSOMAL INFILTRATION ONCE
Refills: 0 | Status: DISCONTINUED | OUTPATIENT
Start: 2021-02-26 | End: 2021-02-27

## 2021-02-26 RX ORDER — DEXTROSE 50 % IN WATER 50 %
25 SYRINGE (ML) INTRAVENOUS ONCE
Refills: 0 | Status: DISCONTINUED | OUTPATIENT
Start: 2021-02-26 | End: 2021-02-27

## 2021-02-26 RX ORDER — DEXTROSE 50 % IN WATER 50 %
15 SYRINGE (ML) INTRAVENOUS ONCE
Refills: 0 | Status: DISCONTINUED | OUTPATIENT
Start: 2021-02-26 | End: 2021-02-27

## 2021-02-26 RX ORDER — HYDROMORPHONE HYDROCHLORIDE 2 MG/ML
0.5 INJECTION INTRAMUSCULAR; INTRAVENOUS; SUBCUTANEOUS
Refills: 0 | Status: DISCONTINUED | OUTPATIENT
Start: 2021-02-26 | End: 2021-02-26

## 2021-02-26 RX ORDER — OXYCODONE HYDROCHLORIDE 5 MG/1
10 TABLET ORAL EVERY 6 HOURS
Refills: 0 | Status: DISCONTINUED | OUTPATIENT
Start: 2021-02-26 | End: 2021-02-27

## 2021-02-26 RX ADMIN — Medication 6: at 11:39

## 2021-02-26 RX ADMIN — SODIUM CHLORIDE 120 MILLILITER(S): 9 INJECTION, SOLUTION INTRAVENOUS at 20:56

## 2021-02-26 RX ADMIN — Medication 6: at 17:28

## 2021-02-26 RX ADMIN — Medication 12: at 21:19

## 2021-02-26 RX ADMIN — Medication 12.5 MILLIGRAM(S): at 23:56

## 2021-02-26 NOTE — H&P ADULT - HISTORY OF PRESENT ILLNESS
61 y/o M pmh DM Metastatic Pancreatic Cancer w. Hepatic mets s/p Whipple 6 years ago chemotherapy, and radioablation 02/2018 presents for radioablation. Previously underwent tumor resection after ablation 02/2018. Subsequently underwent diagnostic laparoscopy and liver biopsy of 1.5 x 1.5cm liver mass w/ microwave ablation of the liver mass. 59 y/o M pmh DM, HLD, Vit D deficiency and Pancreatic Cancer s/p Whipple 6 years ago w/ 6 months adjuvant chemotherapy, and microwave ablation 2018 presents for surgery.  Previously underwent tumor resection after ablation 02/2018. Subsequently underwent diagnostic laparoscopy and liver biopsy of 1.5 x 1.5cm liver mass w/ microwave ablation of the liver mass. Presents w/ metastatic disease w/ planned targeted therapy. Presents today for liver biopsy and microwave ablation of liver lesions.  Recent cardiac work up 2/25 w/ mild dilation of aortic root (3.7cm).

## 2021-02-26 NOTE — H&P ADULT - NSICDXPASTMEDICALHX_GEN_ALL_CORE_FT
PAST MEDICAL HISTORY:  Hyperlipidemia Hyperlipidemia    Hypokalemia Hypokalemia    Jaundice Jaundice    Liver mass     Malignant neoplasm of pancreas obstructive    Obesity Obesity    Type 2 diabetes mellitus     Vitamin D deficiency Vitamin D deficiency

## 2021-02-26 NOTE — BRIEF OPERATIVE NOTE - OPERATION/FINDINGS
Access via verses and Optiview. Extensive adhesions noted, most prominent in the RUQ w/ multiple metastatic liver lesions. Lysis of adhesions performed. 3cm wedge biopsy of segment 3 lesion. Microwave ablation of 4cm segment 3 mass and 2cm segment 7 mass. Hemostasis achieved w/ argon beam and fibrin spray. Instruments removed and fascia closed w/ Vicryl, skin closed w/ Monocryl. Access via verses and Optiview. Extensive adhesions noted, most prominent in the RUQ w/ multiple metastatic liver lesions. Lysis of adhesions performed. 3cm wedge biopsy of segment 3 lesion. Microwave ablation of 4cm segment 4 mass and 2cm segment 7 mass. Hemostasis achieved w/ argon beam and fibrin spray. Instruments removed and fascia closed w/ Vicryl, skin closed w/ Monocryl. Access via verses and Optiview. Extensive adhesions noted, most prominent in the RUQ w/ multiple metastatic liver lesions. Lysis of adhesions performed. 3cm wedge biopsy of segment 3 lesion. Microwave ablation under ultrasound guidance of 4cm segment 4 mass and 2cm segment 7 mass. Hemostasis achieved w/ argon beam and fibrin spray. Instruments removed and fascia closed w/ Vicryl, skin closed w/ Monocryl.

## 2021-02-26 NOTE — H&P ADULT - NSHPPHYSICALEXAM_GEN_ALL_CORE
Vital Signs Last 24 Hrs  T(C): 36.8 (25 Feb 2021 15:13), Max: 36.8 (25 Feb 2021 15:13)  T(F): 98.2 (25 Feb 2021 15:13), Max: 98.2 (25 Feb 2021 15:13)  HR: 106 (25 Feb 2021 15:13) (106 - 106)  BP: 124/72 (25 Feb 2021 15:13) (124/72 - 124/72)  BP(mean): --  RR: 16 (25 Feb 2021 15:13) (16 - 16)  SpO2: 98% (25 Feb 2021 15:13) (98% - 98%)    PHYSICAL EXAM:  General: NAD, resting comfortably in bed  C/V: NSR  Pulm: Nonlabored breathing, no respiratory distress  Abd: soft, non-tender, non-distended, incision intact  Extrem: WWP, no edema,  Neuro: A/O x 3, CNs II-XII grossly intact, no focal deficits, normal sensation  Pulses: palpable distal pulses

## 2021-02-26 NOTE — PROGRESS NOTE ADULT - SUBJECTIVE AND OBJECTIVE BOX
POST-OPERATIVE NOTE    Procedure: Microwave ablation of metastatic hepatic lesions    Surgeon: Tristian    S:   Pt seen and examined by resident   has no complaints.   Denies CP, SOB, CARRANZA, calf tenderness.   Pain controlled with medication.   Denies nausea, vomiting.  AROBF    O:  T(C): 36.2 (02-26-21 @ 13:22), Max: 36.2 (02-26-21 @ 13:22)  T(F): 97.1 (02-26-21 @ 13:22), Max: 97.1 (02-26-21 @ 13:22)  HR: 65 (02-26-21 @ 12:56) (65 - 82)  BP: 141/80 (02-26-21 @ 12:56) (137/79 - 148/86)  RR: 20 (02-26-21 @ 12:56) (20 - 23)  SpO2: 99% (02-26-21 @ 12:56) (97% - 100%)  Wt(kg): --      Gen: NAD, resting comfortably in bed  C/V: NSR  Pulm: Nonlabored breathing, no respiratory distress  Abd: soft, ND, incisions - C/D/G, moderate RUQ TTP.   Extrem: WWP, no calf edema or tenderness, SCDs in place

## 2021-02-26 NOTE — H&P ADULT - ASSESSMENT
61 y/o M pmh DM, HLD, Vit D deficiency and Pancreatic Cancer s/p Whipple 6 years ago w/ 6 months adjuvant chemotherapy, and microwave ablation 2018 presents for surgery.     Will admit post op  Home meds  Pain/ Nausea control  pre op  additional recommendations pending procedure 61 y/o M pmh DM, HLD, Vit D deficiency and Pancreatic Cancer s/p Whipple 6 years ago w/ 6 months adjuvant chemotherapy, and microwave ablation 2018 presents for Diagnostic Laparoscopy wedge biopsy and microwave ablation.      Will admit post op  Home meds  Pain/ Nausea control  pre op  additional recommendations pending procedure

## 2021-02-26 NOTE — PACU DISCHARGE NOTE - COMMENTS
Report to 8 L RN. VSS. Lap sites dry and intact. IV site intact. transported on stretcher with monitor.

## 2021-02-27 ENCOUNTER — TRANSCRIPTION ENCOUNTER (OUTPATIENT)
Age: 61
End: 2021-02-27

## 2021-02-27 VITALS
OXYGEN SATURATION: 98 % | SYSTOLIC BLOOD PRESSURE: 102 MMHG | RESPIRATION RATE: 18 BRPM | HEART RATE: 98 BPM | DIASTOLIC BLOOD PRESSURE: 61 MMHG

## 2021-02-27 LAB
A1C WITH ESTIMATED AVERAGE GLUCOSE RESULT: 6.2 % — HIGH (ref 4–5.6)
ALBUMIN SERPL ELPH-MCNC: 2.7 G/DL — LOW (ref 3.3–5)
ALP SERPL-CCNC: 258 U/L — HIGH (ref 40–120)
ALT FLD-CCNC: 85 U/L — HIGH (ref 10–45)
ANION GAP SERPL CALC-SCNC: 9 MMOL/L — SIGNIFICANT CHANGE UP (ref 5–17)
AST SERPL-CCNC: 203 U/L — HIGH (ref 10–40)
BILIRUB SERPL-MCNC: 0.8 MG/DL — SIGNIFICANT CHANGE UP (ref 0.2–1.2)
BUN SERPL-MCNC: 10 MG/DL — SIGNIFICANT CHANGE UP (ref 7–23)
CALCIUM SERPL-MCNC: 9.1 MG/DL — SIGNIFICANT CHANGE UP (ref 8.4–10.5)
CHLORIDE SERPL-SCNC: 105 MMOL/L — SIGNIFICANT CHANGE UP (ref 96–108)
CO2 SERPL-SCNC: 22 MMOL/L — SIGNIFICANT CHANGE UP (ref 22–31)
CREAT SERPL-MCNC: 0.63 MG/DL — SIGNIFICANT CHANGE UP (ref 0.5–1.3)
ESTIMATED AVERAGE GLUCOSE: 131 MG/DL — HIGH (ref 68–114)
GLUCOSE BLDC GLUCOMTR-MCNC: 134 MG/DL — HIGH (ref 70–99)
GLUCOSE BLDC GLUCOMTR-MCNC: 163 MG/DL — HIGH (ref 70–99)
GLUCOSE BLDC GLUCOMTR-MCNC: 194 MG/DL — HIGH (ref 70–99)
GLUCOSE BLDC GLUCOMTR-MCNC: 276 MG/DL — HIGH (ref 70–99)
GLUCOSE SERPL-MCNC: 141 MG/DL — HIGH (ref 70–99)
HCT VFR BLD CALC: 29.2 % — LOW (ref 39–50)
HCV AB S/CO SERPL IA: 0.31 S/CO — SIGNIFICANT CHANGE UP
HCV AB SERPL-IMP: SIGNIFICANT CHANGE UP
HGB BLD-MCNC: 8.9 G/DL — LOW (ref 13–17)
MAGNESIUM SERPL-MCNC: 2.2 MG/DL — SIGNIFICANT CHANGE UP (ref 1.6–2.6)
MCHC RBC-ENTMCNC: 29.2 PG — SIGNIFICANT CHANGE UP (ref 27–34)
MCHC RBC-ENTMCNC: 30.5 GM/DL — LOW (ref 32–36)
MCV RBC AUTO: 95.7 FL — SIGNIFICANT CHANGE UP (ref 80–100)
NRBC # BLD: 0 /100 WBCS — SIGNIFICANT CHANGE UP (ref 0–0)
PHOSPHATE SERPL-MCNC: 3.2 MG/DL — SIGNIFICANT CHANGE UP (ref 2.5–4.5)
PLATELET # BLD AUTO: 236 K/UL — SIGNIFICANT CHANGE UP (ref 150–400)
POTASSIUM SERPL-MCNC: 3.9 MMOL/L — SIGNIFICANT CHANGE UP (ref 3.5–5.3)
POTASSIUM SERPL-SCNC: 3.9 MMOL/L — SIGNIFICANT CHANGE UP (ref 3.5–5.3)
PROT SERPL-MCNC: 8 G/DL — SIGNIFICANT CHANGE UP (ref 6–8.3)
RBC # BLD: 3.05 M/UL — LOW (ref 4.2–5.8)
RBC # FLD: 20.9 % — HIGH (ref 10.3–14.5)
SODIUM SERPL-SCNC: 136 MMOL/L — SIGNIFICANT CHANGE UP (ref 135–145)
WBC # BLD: 12.15 K/UL — HIGH (ref 3.8–10.5)
WBC # FLD AUTO: 12.15 K/UL — HIGH (ref 3.8–10.5)

## 2021-02-27 RX ORDER — POTASSIUM CHLORIDE 20 MEQ
20 PACKET (EA) ORAL ONCE
Refills: 0 | Status: COMPLETED | OUTPATIENT
Start: 2021-02-27 | End: 2021-02-27

## 2021-02-27 RX ORDER — DOCUSATE SODIUM 100 MG
1 CAPSULE ORAL
Qty: 8 | Refills: 0
Start: 2021-02-27 | End: 2021-03-02

## 2021-02-27 RX ORDER — OXYCODONE HYDROCHLORIDE 5 MG/1
1 TABLET ORAL
Qty: 10 | Refills: 0
Start: 2021-02-27

## 2021-02-27 RX ORDER — SODIUM CHLORIDE 9 MG/ML
1000 INJECTION, SOLUTION INTRAVENOUS
Refills: 0 | Status: DISCONTINUED | OUTPATIENT
Start: 2021-02-27 | End: 2021-02-27

## 2021-02-27 RX ADMIN — Medication 2: at 11:50

## 2021-02-27 RX ADMIN — Medication 20 MILLIEQUIVALENT(S): at 09:22

## 2021-02-27 RX ADMIN — SODIUM CHLORIDE 100 MILLILITER(S): 9 INJECTION, SOLUTION INTRAVENOUS at 06:57

## 2021-02-27 NOTE — DISCHARGE NOTE PROVIDER - NSDCMRMEDTOKEN_GEN_ALL_CORE_FT
Colace 100 mg oral capsule: 1 cap(s) orally 2 times a day, As Needed -for congestion - for constipation   glimepiride 2 mg oral tablet: 1 tab(s) orally once a day  Glucose monitoring strip: 1 unit(s) by percutaneous administration 4 times a day   Lancet: Apply topically to affected area 4 times a day   metFORMIN 500 mg oral tablet: 1 tab(s) orally 2 times a day  oxyCODONE 5 mg oral capsule: 1 cap(s) orally every 6 hours, As Needed -for rash - for severe pain MDD:4

## 2021-02-27 NOTE — DISCHARGE NOTE PROVIDER - HOSPITAL COURSE
61 yo M w/ PMH of DM, HLD, Vitamin D deficiency and Pancreatic Cancer s/p Whipple 6 years ago w/ 6 months adjuvant chemotherapy, and microwave ablation 2018 presents for surgery. Previously underwent tumor resection after ablation 02/2018. Subsequently underwent diagnostic laparoscopy and liver biopsy of 1.5 x 1.5cm liver mass w/ microwave ablation of the liver mass. Presents w/ metastatic disease w/ planned targeted therapy. Presents today for liver biopsy and microwave ablation of liver lesions.  Recent cardiac work up 2/25 w/ mild dilation of aortic root (3.7cm).      2/26: s/p Diagnostic Laparoscopy, Lysis of Adhesions, wedge biopsy, and microwave ablation, the patient tolerated the procedure well. His POC was WNL.    On the day of discharge the patient was tolerating diet w/o nausea or vomiting and was having adequate urination. He was stable to be discharged home.

## 2021-02-27 NOTE — DISCHARGE NOTE PROVIDER - NSDCCPCAREPLAN_GEN_ALL_CORE_FT
PRINCIPAL DISCHARGE DIAGNOSIS  Diagnosis: Liver mass  Assessment and Plan of Treatment:       SECONDARY DISCHARGE DIAGNOSES  Diagnosis: Vitamin D deficiency  Assessment and Plan of Treatment:     Diagnosis: Hyperlipidemia  Assessment and Plan of Treatment:     Diagnosis: Obesity  Assessment and Plan of Treatment:     Diagnosis: Jaundice  Assessment and Plan of Treatment:     Diagnosis: Malignant neoplasm of pancreas  Assessment and Plan of Treatment:     Diagnosis: Hypokalemia  Assessment and Plan of Treatment:     Diagnosis: Type 2 diabetes mellitus  Assessment and Plan of Treatment:

## 2021-02-27 NOTE — DISCHARGE NOTE PROVIDER - NSDCCPTREATMENT_GEN_ALL_CORE_FT
PRINCIPAL PROCEDURE  Procedure: Microwave ablation, mass, liver  Findings and Treatment: Warning Signs:  Please call your doctor or nurse practitioner if you experience the following:  *You experience new chest pain, pressure, squeezing or tightness.  *New or worsening cough, shortness of breath, or wheeze.  *If you are vomiting and cannot keep down fluids or your medications.  *You are getting dehydrated due to continued vomiting, diarrhea, or other reasons. Signs of dehydration include dry mouth, rapid heartbeat, or feeling dizzy or faint when standing.  *You see blood or dark/black material when you vomit or have a bowel movement.  *You experience burning when you urinate, have blood in your urine, or experience a discharge.  *Your pain is not improving within 8-12 hours or is not gone within 24 hours. Call or return immediately if your pain is getting worse, changes location, or moves to your chest or back.  *You have shaking chills, or fever greater than 101.5 degrees Fahrenheit or 38 degrees Celsius.  *Any change in your symptoms, or any new symptoms that concern you.      SECONDARY PROCEDURE  Procedure: Open liver biopsy  Findings and Treatment:

## 2021-02-27 NOTE — DISCHARGE NOTE PROVIDER - NSDCFUADDINST_GEN_ALL_CORE_FT
Follow up with Dr. Lubin in 1-2 weeks. Call the office at  to schedule your appointment.     You may shower; soap and water over incision sites. Do not scrub. Pat dry when done.    Ambulate as tolerated, but no heavy lifting (>10lbs) or strenuous exercise.     You may resume regular diet.     Call the office if you experience increasing pain, redness, swelling or drainage from incision sites/wounds, or temperature >101.4F.

## 2021-02-27 NOTE — DISCHARGE NOTE NURSING/CASE MANAGEMENT/SOCIAL WORK - PATIENT PORTAL LINK FT
You can access the FollowMyHealth Patient Portal offered by [facility] by registering at the following website: [email or web address]. By joining [employer]’s FollowMyHealth portal, you will also be able to view your health information using other applications (apps) compatible with our system.

## 2021-02-27 NOTE — PROGRESS NOTE ADULT - ASSESSMENT
61 y/o M pmh DM, HLD, Vit D deficiency and Pancreatic Cancer s/p Whipple 6 years ago w/ 6 months adjuvant chemotherapy, now s/p Diagnostic Lap, Lysis of Adhesions, hepatic wedge biopsy/resection, and microwave ablation of hepatic lesions on 2/26    Pain/ Nausea control  NPO/IVF  Home meds as appropriate  SCDs  IS/OOBA    
59 yo M w/ PMH of DM, HLD, Vitamin D deficiency and Pancreatic Cancer s/p Whipple 6 years ago w/ 6 months adjuvant chemotherapy, now s/p Diagnostic Lap, Lysis of Adhesions, hepatic wedge biopsy/resection, and microwave ablation of hepatic lesions on 2/26.    Pain/ Nausea control PRN  CLD/IVF  Home meds as appropriate  SCDs  IS/OOBA  AM labs

## 2021-02-27 NOTE — DISCHARGE NOTE PROVIDER - CARE PROVIDER_API CALL
Roberto Lubin  SURGERY  [address] [place] [address]  Phone: [phone]  Fax: [phone]  Follow Up Time:

## 2021-03-03 LAB — SURGICAL PATHOLOGY STUDY: SIGNIFICANT CHANGE UP

## 2021-03-04 DIAGNOSIS — Z88.5 ALLERGY STATUS TO NARCOTIC AGENT: ICD-10-CM

## 2021-03-04 DIAGNOSIS — E11.9 TYPE 2 DIABETES MELLITUS WITHOUT COMPLICATIONS: ICD-10-CM

## 2021-03-04 DIAGNOSIS — I77.819 AORTIC ECTASIA, UNSPECIFIED SITE: ICD-10-CM

## 2021-03-04 DIAGNOSIS — C25.9 MALIGNANT NEOPLASM OF PANCREAS, UNSPECIFIED: ICD-10-CM

## 2021-03-04 DIAGNOSIS — C78.7 SECONDARY MALIGNANT NEOPLASM OF LIVER AND INTRAHEPATIC BILE DUCT: ICD-10-CM

## 2021-03-04 DIAGNOSIS — R16.0 HEPATOMEGALY, NOT ELSEWHERE CLASSIFIED: ICD-10-CM

## 2021-03-04 DIAGNOSIS — E78.5 HYPERLIPIDEMIA, UNSPECIFIED: ICD-10-CM

## 2021-03-04 DIAGNOSIS — K66.0 PERITONEAL ADHESIONS (POSTPROCEDURAL) (POSTINFECTION): ICD-10-CM

## 2021-03-16 PROCEDURE — 82962 GLUCOSE BLOOD TEST: CPT

## 2021-03-16 PROCEDURE — 88307 TISSUE EXAM BY PATHOLOGIST: CPT

## 2021-03-16 PROCEDURE — 83036 HEMOGLOBIN GLYCOSYLATED A1C: CPT

## 2021-03-16 PROCEDURE — 86900 BLOOD TYPING SEROLOGIC ABO: CPT

## 2021-03-16 PROCEDURE — 88341 IMHCHEM/IMCYTCHM EA ADD ANTB: CPT

## 2021-03-16 PROCEDURE — 86901 BLOOD TYPING SEROLOGIC RH(D): CPT

## 2021-03-16 PROCEDURE — 88344 IMHCHEM/IMCYTCHM EA MLT ANTB: CPT

## 2021-03-16 PROCEDURE — 86850 RBC ANTIBODY SCREEN: CPT

## 2021-03-16 PROCEDURE — 86803 HEPATITIS C AB TEST: CPT

## 2021-03-16 PROCEDURE — 85027 COMPLETE CBC AUTOMATED: CPT

## 2021-03-16 PROCEDURE — 88342 IMHCHEM/IMCYTCHM 1ST ANTB: CPT

## 2021-03-16 PROCEDURE — 36415 COLL VENOUS BLD VENIPUNCTURE: CPT

## 2021-03-16 PROCEDURE — C1889: CPT

## 2021-03-16 PROCEDURE — 84100 ASSAY OF PHOSPHORUS: CPT

## 2021-03-16 PROCEDURE — 83735 ASSAY OF MAGNESIUM: CPT

## 2021-03-16 PROCEDURE — 80053 COMPREHEN METABOLIC PANEL: CPT

## 2021-04-26 VITALS
HEART RATE: 98 BPM | TEMPERATURE: 99 F | SYSTOLIC BLOOD PRESSURE: 120 MMHG | WEIGHT: 222.01 LBS | RESPIRATION RATE: 16 BRPM | DIASTOLIC BLOOD PRESSURE: 69 MMHG | OXYGEN SATURATION: 98 % | HEIGHT: 75 IN

## 2021-04-26 RX ORDER — CHLORHEXIDINE GLUCONATE 213 G/1000ML
1 SOLUTION TOPICAL ONCE
Refills: 0 | Status: DISCONTINUED | OUTPATIENT
Start: 2021-04-28 | End: 2021-04-29

## 2021-04-26 NOTE — H&P ADULT - ASSESSMENT
59 yo Male former smoker w/ PMHx of Hyperlipidemia (not on medication), Diabetes, Vitamin D deficiency and Pancreatic Cancer s/p Whipple 6 years ago w/ 6 months adjuvant chemotherapy, and microwave ablation 2018 presents for surgery. Previously underwent tumor resection after ablation 02/2018. Subsequently underwent diagnostic laparoscopy and liver biopsy of 1.5 x 1.5cm liver mass w/ microwave ablation of the liver mass. Pt now presents for chemoinfusion of liver mets with Dr Rudd.      ASA IV, Mallampati II    Hgb/HCT: 11.0/34.6. Pt denies bleeding.   Cr. 0.53.       Pt to be consented for procedure by Dr. Rudd and his team.

## 2021-04-26 NOTE — H&P ADULT - HISTORY OF PRESENT ILLNESS
SKE[place]    Outpt MD: Dr Kenneth Lubin  Escort:  Pharmacy:  [employer] PCR:      59 yo Male w/ PMHx of Hyperlipidemia, Diabetes, Vitamin D deficiency and Pancreatic Cancer s/p Whipple 6 years ago w/ 6 months adjuvant chemotherapy, and microwave ablation 2018 presents for surgery. Previously underwent tumor resection after ablation 02/2018. Subsequently underwent diagnostic laparoscopy and liver biopsy of 1.5 x 1.5cm liver mass w/ microwave ablation of the liver mass. Presents w/ metastatic disease w/ planned targeted therapy. Presents today for liver biopsy and microwave ablation of liver lesions.  Recent cardiac work up 2/25 w/ mild dilation of aortic root (3.7cm).      2/26: s/p Diagnostic Laparoscopy, Lysis of Adhesions, wedge biopsy, and microwave ablation, the patient tolerated the procedure well. His POC was WNL.               [place]    Outpt MD: Dr Kenneth Lubin  Escort:  Pharmacy:  [employer] PCR:      59 yo Male w/ PMHx of Hyperlipidemia, Diabetes, Vitamin D deficiency and Pancreatic Cancer s/p Whipple 6 years ago w/ 6 months adjuvant chemotherapy, and microwave ablation 2018 presents for surgery. Previously underwent tumor resection after ablation 02/2018. Subsequently underwent diagnostic laparoscopy and liver biopsy of 1.5 x 1.5cm liver mass w/ microwave ablation of the liver mass. Presents w/ metastatic disease w/ planned targeted therapy. Presents today for liver biopsy and microwave ablation of liver lesions.  Recent cardiac work up 2/25 w/ mild dilation of aortic root (3.7cm).  Pt's most recent procedure was at [facility] on 2/26/21 s/p Diagnostic Laparoscopy, Lysis of Adhesions, wedge biopsy, and microwave ablation, the patient tolerated the procedure well.    Pt now presents for chemoinfusion of liver mets.                [place]    Outpt MD: Dr Kenneth Lubin  Escort: friend   Pharmacy: [facility] (in [facility])  COVID PCR:       61 yo Male w/ PMHx of Hyperlipidemia, Diabetes, Vitamin D deficiency and Pancreatic Cancer s/p Whipple 6 years ago w/ 6 months adjuvant chemotherapy, and microwave ablation 2018 presents for surgery. Previously underwent tumor resection after ablation 02/2018. Subsequently underwent diagnostic laparoscopy and liver biopsy of 1.5 x 1.5cm liver mass w/ microwave ablation of the liver mass. Presents w/ metastatic disease w/ planned targeted therapy. Presents today for liver biopsy and microwave ablation of liver lesions.  Recent cardiac work up 2/25 w/ mild dilation of aortic root (3.7cm).  Pt's most recent procedure was at [facility] on 2/26/21 s/p Diagnostic Laparoscopy, Lysis of Adhesions, wedge biopsy, and microwave ablation, the patient tolerated the procedure well.    Pt now presents for chemoinfusion of liver mets with Dr Rudd.                 Outpt MD: Dr Kenneth Lubin  Escort: friend   Pharmacy: [facility] (in [facility])  COVID PCR: 4/26- Neg in the chart       61 yo Male former smoker w/ PMHx of Hyperlipidemia (not on medication), Diabetes, Vitamin D deficiency and Pancreatic Cancer s/p Whipple 6 years ago w/ 6 months adjuvant chemotherapy, and microwave ablation 2018 presents for surgery. Previously underwent tumor resection after ablation 02/2018. Subsequently underwent diagnostic laparoscopy and liver biopsy of 1.5 x 1.5cm liver mass w/ microwave ablation of the liver mass. Pt reports constant RUQ pain, denies fever, chills, CP, SOB, dizziness, syncope. Presents today for liver biopsy and microwave ablation of liver lesions. Recent cardiac work up 2/25 w/ mild dilation of aortic root (3.7cm).  Pt's most recent procedure was at [facility] on 2/26/21 s/p Diagnostic Laparoscopy, Lysis of Adhesions, wedge biopsy, and microwave ablation, the patient tolerated the procedure well. Pt now presents for chemoinfusion of liver mets with Dr Rudd.

## 2021-04-26 NOTE — H&P ADULT - RESPIRATORY
Ventricular Rate : 85   Atrial Rate : 85   P-R Interval : 135   QRS Duration : 136   Q-T Interval : 420   QTC Calculation(Bezet) : 499   P Axis : 61   R Axis : -78   T Axis : 55   Diagnosis : Sinus rhythm~Right atrial enlargement~RBBB and LAFB~Left ventricular hypertrophy~Anterolateral Q waves, probably due to LVH~Anterior ST elevation, probably due to LVH~~Confirmed by Ialn Jones ([id number]) on 6/19/2017 6:07:41 PM      detailed exam

## 2021-04-26 NOTE — H&P ADULT - NSHPLABSRESULTS_GEN_ALL_CORE
ECG: NSR @ 88bpm, no STT changes                        11.0   9.69  )-----------( 199      ( 28 Apr 2021 11:55 )             34.6       04-28    137  |  102  |  6<L>  ----------------------------<  196<H>  4.2   |  24  |  0.53    Ca    9.2      28 Apr 2021 11:56    TPro  8.9<H>  /  Alb  3.4  /  TBili  0.4  /  DBili  x   /  AST  28  /  ALT  25  /  AlkPhos  185<H>  04-28      PT/INR - ( 28 Apr 2021 11:55 )   PT: 14.2 sec;   INR: 1.19          PTT - ( 28 Apr 2021 11:55 )  PTT:28.7 sec

## 2021-04-26 NOTE — H&P ADULT - NSICDXPASTSURGICALHX_GEN_ALL_CORE_FT
PAST SURGICAL HISTORY:  Elective surgery for purposes other than treating health conditions billary sphinecterotomy of common bile duct stricture with splint placement  Whipple Procedure    History of pancreatic surgery     Other postprocedural status History of tonsillectomy    Surgery, elective liver ablation, feb 2018

## 2021-04-28 ENCOUNTER — INPATIENT (INPATIENT)
Facility: HOSPITAL | Age: 61
LOS: 0 days | Discharge: HOME | DRG: 847 | End: 2021-04-29
Attending: RADIOLOGY | Admitting: RADIOLOGY
Payer: COMMERCIAL

## 2021-04-28 DIAGNOSIS — Z98.890 OTHER SPECIFIED POSTPROCEDURAL STATES: Chronic | ICD-10-CM

## 2021-04-28 DIAGNOSIS — Z41.9 ENCOUNTER FOR PROCEDURE FOR PURPOSES OTHER THAN REMEDYING HEALTH STATE, UNSPECIFIED: Chronic | ICD-10-CM

## 2021-04-28 LAB
ALBUMIN SERPL ELPH-MCNC: 3.4 G/DL — SIGNIFICANT CHANGE UP (ref 3.3–5)
ALP SERPL-CCNC: 185 U/L — HIGH (ref 40–120)
ALT FLD-CCNC: 25 U/L — SIGNIFICANT CHANGE UP (ref 10–45)
ANION GAP SERPL CALC-SCNC: 11 MMOL/L — SIGNIFICANT CHANGE UP (ref 5–17)
APTT BLD: 28.7 SEC — SIGNIFICANT CHANGE UP (ref 27.5–35.5)
AST SERPL-CCNC: 28 U/L — SIGNIFICANT CHANGE UP (ref 10–40)
BASOPHILS # BLD AUTO: 0.03 K/UL — SIGNIFICANT CHANGE UP (ref 0–0.2)
BASOPHILS NFR BLD AUTO: 0.3 % — SIGNIFICANT CHANGE UP (ref 0–2)
BILIRUB SERPL-MCNC: 0.4 MG/DL — SIGNIFICANT CHANGE UP (ref 0.2–1.2)
BUN SERPL-MCNC: 6 MG/DL — LOW (ref 7–23)
CALCIUM SERPL-MCNC: 9.2 MG/DL — SIGNIFICANT CHANGE UP (ref 8.4–10.5)
CHLORIDE SERPL-SCNC: 102 MMOL/L — SIGNIFICANT CHANGE UP (ref 96–108)
CHOLEST SERPL-MCNC: 145 MG/DL — SIGNIFICANT CHANGE UP
CO2 SERPL-SCNC: 24 MMOL/L — SIGNIFICANT CHANGE UP (ref 22–31)
CREAT SERPL-MCNC: 0.53 MG/DL — SIGNIFICANT CHANGE UP (ref 0.5–1.3)
EOSINOPHIL # BLD AUTO: 0.1 K/UL — SIGNIFICANT CHANGE UP (ref 0–0.5)
EOSINOPHIL NFR BLD AUTO: 1 % — SIGNIFICANT CHANGE UP (ref 0–6)
GLUCOSE BLDC GLUCOMTR-MCNC: 128 MG/DL — HIGH (ref 70–99)
GLUCOSE BLDC GLUCOMTR-MCNC: 190 MG/DL — HIGH (ref 70–99)
GLUCOSE SERPL-MCNC: 196 MG/DL — HIGH (ref 70–99)
HCT VFR BLD CALC: 34.6 % — LOW (ref 39–50)
HDLC SERPL-MCNC: 31 MG/DL — LOW
HGB BLD-MCNC: 11 G/DL — LOW (ref 13–17)
IMM GRANULOCYTES NFR BLD AUTO: 0.2 % — SIGNIFICANT CHANGE UP (ref 0–1.5)
INR BLD: 1.19 — HIGH (ref 0.88–1.16)
LIPID PNL WITH DIRECT LDL SERPL: 103 MG/DL — HIGH
LYMPHOCYTES # BLD AUTO: 1.24 K/UL — SIGNIFICANT CHANGE UP (ref 1–3.3)
LYMPHOCYTES # BLD AUTO: 12.8 % — LOW (ref 13–44)
MCHC RBC-ENTMCNC: 28.7 PG — SIGNIFICANT CHANGE UP (ref 27–34)
MCHC RBC-ENTMCNC: 31.8 GM/DL — LOW (ref 32–36)
MCV RBC AUTO: 90.3 FL — SIGNIFICANT CHANGE UP (ref 80–100)
MONOCYTES # BLD AUTO: 0.78 K/UL — SIGNIFICANT CHANGE UP (ref 0–0.9)
MONOCYTES NFR BLD AUTO: 8 % — SIGNIFICANT CHANGE UP (ref 2–14)
NEUTROPHILS # BLD AUTO: 7.52 K/UL — HIGH (ref 1.8–7.4)
NEUTROPHILS NFR BLD AUTO: 77.7 % — HIGH (ref 43–77)
NON HDL CHOLESTEROL: 114 MG/DL — SIGNIFICANT CHANGE UP
NRBC # BLD: 0 /100 WBCS — SIGNIFICANT CHANGE UP (ref 0–0)
PLATELET # BLD AUTO: 199 K/UL — SIGNIFICANT CHANGE UP (ref 150–400)
POTASSIUM SERPL-MCNC: 4.2 MMOL/L — SIGNIFICANT CHANGE UP (ref 3.5–5.3)
POTASSIUM SERPL-SCNC: 4.2 MMOL/L — SIGNIFICANT CHANGE UP (ref 3.5–5.3)
PROT SERPL-MCNC: 8.9 G/DL — HIGH (ref 6–8.3)
PROTHROM AB SERPL-ACNC: 14.2 SEC — HIGH (ref 10.6–13.6)
RBC # BLD: 3.83 M/UL — LOW (ref 4.2–5.8)
RBC # FLD: 14.3 % — SIGNIFICANT CHANGE UP (ref 10.3–14.5)
SODIUM SERPL-SCNC: 137 MMOL/L — SIGNIFICANT CHANGE UP (ref 135–145)
TRIGL SERPL-MCNC: 56 MG/DL — SIGNIFICANT CHANGE UP
WBC # BLD: 9.69 K/UL — SIGNIFICANT CHANGE UP (ref 3.8–10.5)
WBC # FLD AUTO: 9.69 K/UL — SIGNIFICANT CHANGE UP (ref 3.8–10.5)

## 2021-04-28 PROCEDURE — 37243 VASC EMBOLIZE/OCCLUDE ORGAN: CPT

## 2021-04-28 PROCEDURE — 75726 ARTERY X-RAYS ABDOMEN: CPT | Mod: 26,59

## 2021-04-28 PROCEDURE — 36246 INS CATH ABD/L-EXT ART 2ND: CPT | Mod: 59

## 2021-04-28 RX ORDER — OXALIPLATIN 5 MG/ML
100 INJECTION, SOLUTION INTRAVENOUS ONCE
Refills: 0 | Status: DISCONTINUED | OUTPATIENT
Start: 2021-04-28 | End: 2021-04-29

## 2021-04-28 RX ORDER — ONDANSETRON 8 MG/1
4 TABLET, FILM COATED ORAL EVERY 4 HOURS
Refills: 0 | Status: DISCONTINUED | OUTPATIENT
Start: 2021-04-28 | End: 2021-04-29

## 2021-04-28 RX ORDER — ONDANSETRON 8 MG/1
16 TABLET, FILM COATED ORAL ONCE
Refills: 0 | Status: DISCONTINUED | OUTPATIENT
Start: 2021-04-28 | End: 2021-04-29

## 2021-04-28 RX ORDER — GEMCITABINE 38 MG/ML
1000 INJECTION, SOLUTION INTRAVENOUS ONCE
Refills: 0 | Status: DISCONTINUED | OUTPATIENT
Start: 2021-04-28 | End: 2021-04-29

## 2021-04-28 RX ORDER — DEXAMETHASONE 0.5 MG/5ML
16 ELIXIR ORAL ONCE
Refills: 0 | Status: DISCONTINUED | OUTPATIENT
Start: 2021-04-28 | End: 2021-04-29

## 2021-04-28 RX ORDER — FLUOROURACIL 50 MG/ML
1000 INJECTION, SOLUTION INTRAVENOUS ONCE
Refills: 0 | Status: DISCONTINUED | OUTPATIENT
Start: 2021-04-28 | End: 2021-04-29

## 2021-04-28 RX ORDER — CEFAZOLIN SODIUM 1 G
1000 VIAL (EA) INJECTION EVERY 8 HOURS
Refills: 0 | Status: DISCONTINUED | OUTPATIENT
Start: 2021-04-28 | End: 2021-04-29

## 2021-04-28 RX ORDER — HYDROMORPHONE HYDROCHLORIDE 2 MG/ML
1 INJECTION INTRAMUSCULAR; INTRAVENOUS; SUBCUTANEOUS EVERY 4 HOURS
Refills: 0 | Status: DISCONTINUED | OUTPATIENT
Start: 2021-04-28 | End: 2021-04-29

## 2021-04-28 RX ORDER — MITOMYCIN 5 MG/10ML
20 INJECTION, POWDER, LYOPHILIZED, FOR SOLUTION INTRAVENOUS ONCE
Refills: 0 | Status: DISCONTINUED | OUTPATIENT
Start: 2021-04-28 | End: 2021-04-29

## 2021-04-28 RX ADMIN — Medication 100 MILLIGRAM(S): at 21:23

## 2021-04-28 NOTE — CONSULT NOTE ADULT - SUBJECTIVE AND OBJECTIVE BOX
Malignant neoplasm of pancreatic duct  Other Medical Problems:  the patient has suffered from Surgical diabetes ever since he had resection of his pancreatic cancer.  Reason for Today's Visit:  3/8/2021:  History of Present Illness    The patient was in good health until 2015 when he developed symptoms of painless jaundice.  He eventually had a CT scan  and other imaging which demonstrated a cancer in the head of the pancreas.  He was found to be resectable and underwent a Whipple  procedure in that year.  The patient was given adjuvant chemotherapy with gemcitabine.  This was given for a period of 6 months.  The  patient was well until 2 and half years later when he developed a single lesion in the liver.  This lesion was biopsied and ablated.   The  patient was given another 4 months of gemcitabine with Abraxane which have to be stopped after 4 cycles because of taxing pulmonary  toxicity.    The patient continued with the gemcitabine for another 2 months. This lesion was biopsied and ablated and the patient was then  disease free until the present time when multiple hepatic metastases were seen and    He then received 4 months of FOLFIRINOX to which  he had no response.  The patient was then referred to his surgeon.  The patient's surgeon then referred him here for regional  chemotherapy.  Prior to the patient's recent surgery which was to biopsy for molecular testing and for oblation the patient had no pain from  the multiple hepatic metastases.  Past Surgical History    when the patient was 15 he had an appendectomy.  Social History  Patient is . Patient lives with spouse. Former Smoker. The patient has 100 pack year smoking history Not applicable. Patient  reports (on average) 1 drinks per week of alcohol. Denies any illicit drug use. The patient is a retired .  Family History  the patient's mother  at age 69 from metastatic bladder cancer. The patient's father is 92 years of age and in relatively good health.  Allergies  no known allergies  Medications  There are no on­going medications for this patient.  Review Of Systems  Pt reports loss of appetite. Pt reports feeling fatigued at times. Patient reports a recent weight loss of %% lbs.   Eyes: Pt reports no blurred vision. Pt reports no double vision. Patient denies any changes in vision.   Ears, Nose, Mouth, Throat: Pt reports no hearing loss. Pt reports no ringing in ears. Pt reports no sinus trouble. Pt reports no trouble  swallowing. Pt reports no sore throat. Pt reports no recent episodes of epistaxis. Pt reports no hoarseness.   Cardiac: Pt reports no chest pains. Pt reports no heart palpitations. Pt reports no light headedness. Pt reports no swelling in legs. Pt  reports no episodes of passing out.   Respiratory: Pt reports no cough. Pt reports no sputum production. Pt reports no hemoptysis. Pt reports no shortness of breath. Pt  reports no orthopnea. Pt reports no nocturnal paroxysmal dyspnea.   Gastrointestinal: Pt reports no nausea. Pt reports no vomiting. Pt reports no heartburn. Pt reports no constipation. Pt reports no diarrhea.  Pt reports no rectal bleeding. Pt reports no bowel incontinence. The patient has a lot of belching recently.   Genito­Urinary: Pt reports no burning on urination. Pt reports no blood in urine. Pt reports no frequent urination. Pt reports no urinary  incontinence.   MusculoSkeletal: Pt reports no muscle pain. Pt reports no stiffness. Pt reports no joint pains. Patient denies any joint swelling. Pt  Cordell Martin : 1960 ([id number]) Page 1 of 3  reports no back pains.   Skin:Denies any skin rashes.   Neurological: Pt reports no headaches. Pt reports no seizures. Pt reports no dizziness. Pt reports no loss of balance. Pt reports no loss  of sensation. Pt reports no tingling sensations. Pt reports no memory loss. Pt reports no thinking difficulty. Pt reports weakness of limbs.   Psychiatric: Pt reports no nervousness. Pt reports no depression Patient denies restlessness. Patient denies any difficulty sleeping.   Hematologic/Lymphatic/Immunologic: Patient denies bruising Patient denies any bleeding. Pt reports no lumps in arm­pits. Pt reports  no lumps in neck. Pt reports no lumps in groin.  Vitals  Vitals on 3/8/2021 2:33:00 PM: Height=75in, Weight=96.2lb, Temp=97.0f, Pulse=121, SystolicBP=97, DiastolicBP=65  Physical Exam  Obese.  Skin: Normal turgor. No rash noted. No lesions.   Eyes: Eyes with PERRLA. EOM's in­tact Sclera clear,no jaundice noted. Conjunctiva clear.   Ears, Nose,Mouth & Throat: Teeth normal, no missing teeth or dentures present. No ulcers. No thrush Auditory canals normal.  Description: %% Oropharynx without lesions. Mucositis None   Neck: No thyromegaly. No lymphadenopathy noted.   Cardiovascular: Normal sinus rhythm S1, S2 without murmurs. No Extrasystole.   Breast: Normal.   Breast: Not examined. No breast masses appreciated. No lumpectomy/mastectomy or reconstruction scarring noted. No axillary  lymphadenopathy. No skin changes. No nipple discharge present. No dimpling noted.  Chest/Respiratory: No dyspnea. No rhonchi. No wheezing. No decreased breath sounds at bases. No rales. Unlabored breathing. No  accessory muscle use.   Gastrointestinal: Abdomen is soft and non­tender to touch. No hepatomegaly noted. No splenomegaly noted. No abdominal pain or  guarding noted. No abdominal mass(es) appreciated. No ascites noted.   Hematologic/Lymphatic: No petechiae noted. No purpura noted. No lymphadenopathy noted.   Musculoskeletal: No Kyphosis. No weakness. No spinal tenderness on percussion. No pain on hip rotation reported by patient. Normal  range of motion in upper and lower extremities.   Neurologic: Affect normal. No tremors observed. Biceps, brachioradialis, patellar and plantar reflexes symmetrical. Normal  proprioception/position sense Cranial nerves intact.   Psychiatry: Oriented to person/place/time. No anxiety observed during exam. No signs of depression noted during exam. Intact short­term  and long­term memory.   Extremities: No clubbing noted to fingers or toes. No cyanosis noted. Capillary refill less than two seconds. No edema. No calf  tenderness reported by patient.

## 2021-04-28 NOTE — CONSULT NOTE ADULT - ASSESSMENT
the patient is a candidate for regional chemo perfusion with the exclusion of Abraxane from the regional chemotherapy.  His life-limiting disease is intrahepatic as he has no extrahepatic disease.

## 2021-04-28 NOTE — BRIEF OPERATIVE NOTE - OPERATION/FINDINGS
Patient prepped and draped in sterile fashion. Access was obtained via micropuncture technique in the right groin. Initial angiography demonstrated anamoly of normal vascular anatomy with left hepatic artery branching off of the celiac artery and right hepatic artery branching off of the SMA. 4-drug chemotherapy regimen was then selectively infused with 25% of the overall solution into the L hepatic and 25% of the overall solution into the R hepatic arteries. Hemostasis was achieved in the end of the case with 20 minutes of manual pressure held on the R groin site. No complications were encountered.

## 2021-04-28 NOTE — CONSULT NOTE ADULT - TIME BILLING
Regional chemo perfusion of the liver for pancreatic cancer with mitomycin-C substituting for Abraxane.  The patient is taxane allergic.

## 2021-04-29 ENCOUNTER — TRANSCRIPTION ENCOUNTER (OUTPATIENT)
Age: 61
End: 2021-04-29

## 2021-04-29 VITALS — TEMPERATURE: 98 F

## 2021-04-29 LAB
ALBUMIN SERPL ELPH-MCNC: 2.9 G/DL — LOW (ref 3.3–5)
ALP SERPL-CCNC: 176 U/L — HIGH (ref 40–120)
ALT FLD-CCNC: 19 U/L — SIGNIFICANT CHANGE UP (ref 10–45)
ANION GAP SERPL CALC-SCNC: 12 MMOL/L — SIGNIFICANT CHANGE UP (ref 5–17)
AST SERPL-CCNC: 19 U/L — SIGNIFICANT CHANGE UP (ref 10–40)
BILIRUB SERPL-MCNC: 0.4 MG/DL — SIGNIFICANT CHANGE UP (ref 0.2–1.2)
BUN SERPL-MCNC: 15 MG/DL — SIGNIFICANT CHANGE UP (ref 7–23)
CALCIUM SERPL-MCNC: 9.3 MG/DL — SIGNIFICANT CHANGE UP (ref 8.4–10.5)
CHLORIDE SERPL-SCNC: 102 MMOL/L — SIGNIFICANT CHANGE UP (ref 96–108)
CO2 SERPL-SCNC: 22 MMOL/L — SIGNIFICANT CHANGE UP (ref 22–31)
COVID-19 SPIKE DOMAIN AB INTERP: POSITIVE
COVID-19 SPIKE DOMAIN ANTIBODY RESULT: >250 U/ML — HIGH
CREAT SERPL-MCNC: 0.62 MG/DL — SIGNIFICANT CHANGE UP (ref 0.5–1.3)
GLUCOSE SERPL-MCNC: 348 MG/DL — HIGH (ref 70–99)
HCT VFR BLD CALC: 33.2 % — LOW (ref 39–50)
HGB BLD-MCNC: 10.7 G/DL — LOW (ref 13–17)
MAGNESIUM SERPL-MCNC: 2 MG/DL — SIGNIFICANT CHANGE UP (ref 1.6–2.6)
MCHC RBC-ENTMCNC: 28.8 PG — SIGNIFICANT CHANGE UP (ref 27–34)
MCHC RBC-ENTMCNC: 32.2 GM/DL — SIGNIFICANT CHANGE UP (ref 32–36)
MCV RBC AUTO: 89.5 FL — SIGNIFICANT CHANGE UP (ref 80–100)
NRBC # BLD: 0 /100 WBCS — SIGNIFICANT CHANGE UP (ref 0–0)
PLATELET # BLD AUTO: 203 K/UL — SIGNIFICANT CHANGE UP (ref 150–400)
POTASSIUM SERPL-MCNC: 4.4 MMOL/L — SIGNIFICANT CHANGE UP (ref 3.5–5.3)
POTASSIUM SERPL-SCNC: 4.4 MMOL/L — SIGNIFICANT CHANGE UP (ref 3.5–5.3)
PROT SERPL-MCNC: 7.7 G/DL — SIGNIFICANT CHANGE UP (ref 6–8.3)
RBC # BLD: 3.71 M/UL — LOW (ref 4.2–5.8)
RBC # FLD: 14 % — SIGNIFICANT CHANGE UP (ref 10.3–14.5)
SARS-COV-2 IGG+IGM SERPL QL IA: >250 U/ML — HIGH
SARS-COV-2 IGG+IGM SERPL QL IA: POSITIVE
SODIUM SERPL-SCNC: 136 MMOL/L — SIGNIFICANT CHANGE UP (ref 135–145)
WBC # BLD: 11.49 K/UL — HIGH (ref 3.8–10.5)
WBC # FLD AUTO: 11.49 K/UL — HIGH (ref 3.8–10.5)

## 2021-04-29 PROCEDURE — 86769 SARS-COV-2 COVID-19 ANTIBODY: CPT

## 2021-04-29 PROCEDURE — C1887: CPT

## 2021-04-29 PROCEDURE — 85027 COMPLETE CBC AUTOMATED: CPT

## 2021-04-29 PROCEDURE — 82962 GLUCOSE BLOOD TEST: CPT

## 2021-04-29 PROCEDURE — 85730 THROMBOPLASTIN TIME PARTIAL: CPT

## 2021-04-29 PROCEDURE — 83735 ASSAY OF MAGNESIUM: CPT

## 2021-04-29 PROCEDURE — C1894: CPT

## 2021-04-29 PROCEDURE — 80053 COMPREHEN METABOLIC PANEL: CPT

## 2021-04-29 PROCEDURE — 36415 COLL VENOUS BLD VENIPUNCTURE: CPT

## 2021-04-29 PROCEDURE — C1769: CPT

## 2021-04-29 PROCEDURE — 85610 PROTHROMBIN TIME: CPT

## 2021-04-29 PROCEDURE — 85025 COMPLETE CBC W/AUTO DIFF WBC: CPT

## 2021-04-29 PROCEDURE — C9399: CPT

## 2021-04-29 PROCEDURE — 80061 LIPID PANEL: CPT

## 2021-04-29 RX ORDER — CEPHALEXIN 500 MG
1 CAPSULE ORAL
Qty: 28 | Refills: 0
Start: 2021-04-29 | End: 2021-05-05

## 2021-04-29 RX ADMIN — Medication 100 MILLIGRAM(S): at 05:37

## 2021-04-29 NOTE — DISCHARGE NOTE PROVIDER - HOSPITAL COURSE
59 y/o male, former smoker, w/ PMHx HLD (not on medication), DM, Vitamin D deficiency, and Pancreatic Cancer (s/p Whipple procedure 6 years ago w/ 6 months adjuvant chemotherapy, and microwave ablation 2018) who presented to [facility] for chemoinfusion w/ Dr. Rudd. Patient previously underwent tumor resection after ablation in 02/2018. Patient subsequently underwent a diagnostic laparoscopy and liver biopsy of 1.5 x 1.5cm liver mass w/ microwave ablation of the liver mass. Patient endorses constant RUQ pain, but denied fever, chills, chest pain, SOB, dizziness, and syncope. Patient had a recent cardiac work up on 02/25/2021 revealing a mild dilation of aortic root (3.7cm). Patient's most recent procedure was at [facility] on 2/26/2021 w/ diagnostic laparoscopy, lysis of adhesions, wedge biopsy, and microwave ablation, all of which the patient tolerated the well. Patient is now s/p chemo-infusion w/ Dr. Rudd on 04/28/2021.     Patient seen and examined at bedside on 04/29/2021 and reported he feels better than he has in months at this time. Patient denied any active complaints on exam and appeared comfortable/in no acute distress on exam. No significant events were noted on telemetry and labs were reviewed and remained stable/at patient's baseline. Patient has now been medically cleared for discharge as per Dr. Rudd. Patient has been given appropriate discharge instructions including medication regimen, access site management, and follow up. Prescriptions the patient requires refills on have been e-prescribed to patient's preferred pharmacy.     VSS   Gen: NAD, A&O x3  Cards: RRR, clear S1 and S2 without murmur  Pulm: CTA B/L without w/r/r  Peripheral: peripheral pulses 2+ B/L  Abd: soft, NT  Ext: no LE edema or ulcerations B/L

## 2021-04-29 NOTE — DISCHARGE NOTE PROVIDER - NSDCCPCAREPLAN_GEN_ALL_CORE_FT
PRINCIPAL DISCHARGE DIAGNOSIS  Diagnosis: Pancreatic cancer  Assessment and Plan of Treatment: You have a history of pancreatic cancer requiring previous surgical procedures and chemo therapy. You have now undergone an additional chemo-infusion procedure w/ Dr. Rudd which you tolerated well. Please [facility].  You have been prescribed ______ and should take this medication as it is prescribed to you.       PRINCIPAL DISCHARGE DIAGNOSIS  Diagnosis: Pancreatic cancer  Assessment and Plan of Treatment: You have a history of pancreatic cancer requiring previous surgical procedures and chemo therapy. You have now undergone an additional chemo-infusion procedure w/ Dr. Rudd which you tolerated well. Please [facility] for [facility] management.  You have been prescribed Keflex 500 mg q6 hours for 7 days and Percocet 5 mg-325 mg PRN for pain and should take these medication as they are prescribed to you.

## 2021-04-29 NOTE — PROGRESS NOTE ADULT - SUBJECTIVE AND OBJECTIVE BOX
the patient is happy that his right upper quadrant pain has disappeared.  He has no ill effects from the chemotherapy.    Physical Exam  Obese.  Skin: Normal turgor. No rash noted. No lesions.   Eyes: Eyes with PERRLA. EOM's in­tact Sclera clear,no jaundice noted. Conjunctiva clear.   Ears, Nose,Mouth & Throat: Teeth normal, no missing teeth or dentures present. No ulcers. No thrush Auditory canals normal.  Description: %% Oropharynx without lesions. Mucositis None   Neck: No thyromegaly. No lymphadenopathy noted.   Cardiovascular: Normal sinus rhythm S1, S2 without murmurs. No Extrasystole.   Breast: Normal.   Breast: Not examined. No breast masses appreciated. No lumpectomy/mastectomy or reconstruction scarring noted. No axillary  lymphadenopathy. No skin changes. No nipple discharge present. No dimpling noted.  Chest/Respiratory: No dyspnea. No rhonchi. No wheezing. No decreased breath sounds at bases. No rales. Unlabored breathing. No  accessory muscle use.   Gastrointestinal: Abdomen is soft and non­tender to touch. No hepatomegaly noted. No splenomegaly noted. No abdominal pain or  guarding noted. No abdominal mass(es) appreciated. No ascites noted.   Hematologic/Lymphatic: No petechiae noted. No purpura noted. No lymphadenopathy noted.   Musculoskeletal: No Kyphosis. No weakness. No spinal tenderness on percussion. No pain on hip rotation reported by patient. Normal  range of motion in upper and lower extremities.   Neurologic: Affect normal. No tremors observed. Biceps, brachioradialis, patellar and plantar reflexes symmetrical. Normal  proprioception/position sense Cranial nerves intact.   Psychiatry: Oriented to person/place/time. No anxiety observed during exam. No signs of depression noted during exam. Intact short­term  and long­term memory.   Extremities: No clubbing noted to fingers or toes. No cyanosis noted. Capillary refill less than two seconds. No edema. No calf  tenderness reported by patient.

## 2021-04-29 NOTE — DISCHARGE NOTE PROVIDER - PROVIDER TOKENS
PROVIDER:[TOKEN:[[id number]],FOLLOWUP:[2 weeks]],PROVIDER:[TOKEN:[[id number]],FOLLOWUP:[2 weeks]]

## 2021-04-29 NOTE — DISCHARGE NOTE PROVIDER - CARE PROVIDER_API CALL
Babar Santos)  Internal Medicine; Medical Oncology  [address] [place] [address]  Phone: [phone]  Fax: [phone]  Follow Up Time: 2 weeks    Roberto Lubin  SURGERY  [address] [place] [address]  Phone: [phone]  Fax: [phone]  Follow Up Time: 2 weeks

## 2021-04-29 NOTE — DISCHARGE NOTE NURSING/CASE MANAGEMENT/SOCIAL WORK - PATIENT PORTAL LINK FT
You can access the FollowMyHealth Patient Portal offered by [facility] by registering at the following website: [email or web address]. By joining [employer]’s FollowMyHealth portal, you will also be able to view your health information using other applications (apps) compatible with our system.

## 2021-04-29 NOTE — PROGRESS NOTE ADULT - ASSESSMENT
The patient had no ill effects from the chemotherapy and he is happy that the right upper quadrant pain is disappeared.  He is hoping that this is not a "placebo" effect.

## 2021-05-10 DIAGNOSIS — Z87.891 PERSONAL HISTORY OF NICOTINE DEPENDENCE: ICD-10-CM

## 2021-05-10 DIAGNOSIS — E11.9 TYPE 2 DIABETES MELLITUS WITHOUT COMPLICATIONS: ICD-10-CM

## 2021-05-10 DIAGNOSIS — Z51.11 ENCOUNTER FOR ANTINEOPLASTIC CHEMOTHERAPY: ICD-10-CM

## 2021-05-10 DIAGNOSIS — C78.7 SECONDARY MALIGNANT NEOPLASM OF LIVER AND INTRAHEPATIC BILE DUCT: ICD-10-CM

## 2021-05-10 DIAGNOSIS — E55.9 VITAMIN D DEFICIENCY, UNSPECIFIED: ICD-10-CM

## 2021-05-10 DIAGNOSIS — Z85.07 PERSONAL HISTORY OF MALIGNANT NEOPLASM OF PANCREAS: ICD-10-CM

## 2021-05-10 DIAGNOSIS — E78.5 HYPERLIPIDEMIA, UNSPECIFIED: ICD-10-CM

## 2021-09-27 VITALS — WEIGHT: 220.02 LBS | HEIGHT: 75 IN

## 2021-09-27 RX ORDER — CHLORHEXIDINE GLUCONATE 213 G/1000ML
1 SOLUTION TOPICAL ONCE
Refills: 0 | Status: COMPLETED | OUTPATIENT
Start: 2021-09-28 | End: 2021-09-28

## 2021-09-27 NOTE — H&P ADULT - NSICDXPASTMEDICALHX_GEN_ALL_CORE_FT
PAST MEDICAL HISTORY:  Hyperlipidemia     Liver mass     Malignant neoplasm of pancreas obstructive    Obesity     Type 2 diabetes mellitus     Vitamin D deficiency

## 2021-09-27 NOTE — H&P ADULT - ASSESSMENT
61 y/o male, former smoker, w/ PMHx HLD (not on medication), DM, Vitamin D deficiency, and Pancreatic Cancer (s/p Whipple procedure 6 years ago w/ 6 months adjuvant chemotherapy, and microwave ablation 2018) with mets to liver, who presents to [facility] for chemoinfusion of pancreatic cancer mets to liver w/ Dr. Rudd. Patient denies N/V, abd pain, CP, SOB, dizziness, palpitations, syncope, fever, chills recent illness.       ASA: II   Mallampati: II      Patient consented by Dr. Rudd and his team

## 2021-09-27 NOTE — H&P ADULT - HEIGHT IN CM
Patient Education        Strep Throat: Care Instructions  Your Care Instructions    Strep throat is a bacterial infection that causes sudden, severe sore throat and fever. Strep throat, which is caused by bacteria called streptococcus, is treated with antibiotics. Sometimes a strep test is necessary to tell if the sore throat is caused by strep bacteria. Treatment can help ease symptoms and may prevent future problems. Follow-up care is a key part of your treatment and safety. Be sure to make and go to all appointments, and call your doctor if you are having problems. It's also a good idea to know your test results and keep a list of the medicines you take. How can you care for yourself at home? · Take your antibiotics as directed. Do not stop taking them just because you feel better. You need to take the full course of antibiotics. · Strep throat can spread to others until 24 hours after you begin taking antibiotics. During this time, you should avoid contact with other people at work or home, especially infants and children. Do not sneeze or cough on others, and wash your hands often. Keep your drinking glass and eating utensils separate from those of others, and wash these items well in hot, soapy water. · Gargle with warm salt water at least once each hour to help reduce swelling and make your throat feel better. Use 1 teaspoon of salt mixed in 8 fluid ounces of warm water. · Take an over-the-counter pain medication, such as acetaminophen (Tylenol), ibuprofen (Advil, Motrin), or naproxen (Aleve). Read and follow all instructions on the label. · Try an over-the-counter anesthetic throat spray or throat lozenges, which may help relieve throat pain. · Drink plenty of fluids. Fluids may help soothe an irritated throat. Hot fluids, such as tea or soup, may help your throat feel better. · Eat soft solids and drink plenty of clear liquids.  Flavored ice pops, ice cream, scrambled eggs, sherbet, and gelatin dessert (such as Jell-O) may also soothe the throat. · Get lots of rest.  · Do not smoke, and avoid secondhand smoke. If you need help quitting, talk to your doctor about stop-smoking programs and medicines. These can increase your chances of quitting for good. · Use a vaporizer or humidifier to add moisture to the air in your bedroom. Follow the directions for cleaning the machine. When should you call for help? Call your doctor now or seek immediate medical care if:    · You have a new or higher fever.     · You have a fever with a stiff neck or severe headache.     · You have new or worse trouble swallowing.     · Your sore throat gets much worse on one side.     · Your pain becomes much worse on one side of your throat.    Watch closely for changes in your health, and be sure to contact your doctor if:    · You are not getting better after 2 days (48 hours).     · You do not get better as expected. Where can you learn more? Go to http://maritza-jack.info/. Enter K625 in the search box to learn more about \"Strep Throat: Care Instructions. \"  Current as of: October 21, 2018  Content Version: 12.2  © 5028-1355 [employer]. Care instructions adapted under license by [employer] (which disclaims liability or warranty for this information). If you have questions about a medical condition or this instruction, always ask your healthcare professional. [place] [address] any warranty or liability for your use of this information. 190.5

## 2021-09-27 NOTE — H&P ADULT - HISTORY OF PRESENT ILLNESS
COVID:  Pharmacy: [facility]  Provider: Dr. Santos    59 y/o male, former smoker, w/ PMHx HLD (not on medication), DM, Vitamin D deficiency, and Pancreatic Cancer (s/p Whipple procedure 6 years ago w/ 6 months adjuvant chemotherapy, and microwave ablation 2018) with mets to liver, who presents to [facility] for chemoinfusion of pancreatic cancer mets to liver w/ Dr. Rudd. Patient denies N/V, abd pain, CP, SOB, dizziness, palpitations, syncope, fever, chills recent illness.  COVID: 9/25 @ [facility]  Pharmacy: [facility]  Provider: Dr. Santos    61 y/o male, former smoker, w/ PMHx HLD (not on medication), DM, Vitamin D deficiency, and Pancreatic Cancer (s/p Whipple procedure 6 years ago w/ 6 months adjuvant chemotherapy, and microwave ablation 2018) with mets to liver, who presents to [facility] for chemoinfusion of pancreatic cancer mets to liver w/ Dr. Rdud. Patient denies N/V, abd pain, CP, SOB, dizziness, palpitations, syncope, fever, chills recent illness.  COVID: 9/25 @ [facility] Negative PCR in paper chart   Pharmacy: [facility]  Provider: Dr. Santos    61 y/o male, former smoker, w/ PMHx HLD (not on medication), DM, Vitamin D deficiency, and Pancreatic Cancer (s/p Whipple procedure 6 years ago w/ 6 months adjuvant chemotherapy, and microwave ablation 2018) with mets to liver, who presents to [facility] for chemoinfusion of pancreatic cancer mets to liver w/ Dr. Rudd. Patient denies N/V, abd pain, CP, SOB, dizziness, palpitations, syncope, fever, chills recent illness.

## 2021-09-28 ENCOUNTER — INPATIENT (INPATIENT)
Facility: HOSPITAL | Age: 61
LOS: 0 days | Discharge: HOME | DRG: 424 | End: 2021-09-29
Attending: RADIOLOGY | Admitting: RADIOLOGY
Payer: COMMERCIAL

## 2021-09-28 DIAGNOSIS — Z98.890 OTHER SPECIFIED POSTPROCEDURAL STATES: Chronic | ICD-10-CM

## 2021-09-28 DIAGNOSIS — Z41.9 ENCOUNTER FOR PROCEDURE FOR PURPOSES OTHER THAN REMEDYING HEALTH STATE, UNSPECIFIED: Chronic | ICD-10-CM

## 2021-09-28 LAB
ALBUMIN SERPL ELPH-MCNC: 3.7 G/DL — SIGNIFICANT CHANGE UP (ref 3.3–5)
ALP SERPL-CCNC: 201 U/L — HIGH (ref 40–120)
ALT FLD-CCNC: 21 U/L — SIGNIFICANT CHANGE UP (ref 10–45)
ANION GAP SERPL CALC-SCNC: 11 MMOL/L — SIGNIFICANT CHANGE UP (ref 5–17)
APTT BLD: 33.3 SEC — SIGNIFICANT CHANGE UP (ref 27.5–35.5)
AST SERPL-CCNC: 32 U/L — SIGNIFICANT CHANGE UP (ref 10–40)
BASOPHILS # BLD AUTO: 0.03 K/UL — SIGNIFICANT CHANGE UP (ref 0–0.2)
BASOPHILS NFR BLD AUTO: 0.4 % — SIGNIFICANT CHANGE UP (ref 0–2)
BILIRUB SERPL-MCNC: 0.4 MG/DL — SIGNIFICANT CHANGE UP (ref 0.2–1.2)
BLD GP AB SCN SERPL QL: NEGATIVE — SIGNIFICANT CHANGE UP
BUN SERPL-MCNC: 10 MG/DL — SIGNIFICANT CHANGE UP (ref 7–23)
CALCIUM SERPL-MCNC: 9.7 MG/DL — SIGNIFICANT CHANGE UP (ref 8.4–10.5)
CHLORIDE SERPL-SCNC: 105 MMOL/L — SIGNIFICANT CHANGE UP (ref 96–108)
CO2 SERPL-SCNC: 19 MMOL/L — LOW (ref 22–31)
CREAT SERPL-MCNC: 0.66 MG/DL — SIGNIFICANT CHANGE UP (ref 0.5–1.3)
EOSINOPHIL # BLD AUTO: 0.19 K/UL — SIGNIFICANT CHANGE UP (ref 0–0.5)
EOSINOPHIL NFR BLD AUTO: 2.4 % — SIGNIFICANT CHANGE UP (ref 0–6)
GLUCOSE BLDC GLUCOMTR-MCNC: 125 MG/DL — HIGH (ref 70–99)
GLUCOSE BLDC GLUCOMTR-MCNC: 170 MG/DL — HIGH (ref 70–99)
GLUCOSE BLDC GLUCOMTR-MCNC: 315 MG/DL — HIGH (ref 70–99)
GLUCOSE BLDC GLUCOMTR-MCNC: 81 MG/DL — SIGNIFICANT CHANGE UP (ref 70–99)
GLUCOSE SERPL-MCNC: 195 MG/DL — HIGH (ref 70–99)
HCT VFR BLD CALC: 37.7 % — LOW (ref 39–50)
HGB BLD-MCNC: 11.9 G/DL — LOW (ref 13–17)
IMM GRANULOCYTES NFR BLD AUTO: 0.6 % — SIGNIFICANT CHANGE UP (ref 0–1.5)
INR BLD: 1.08 — SIGNIFICANT CHANGE UP (ref 0.88–1.16)
LYMPHOCYTES # BLD AUTO: 1.13 K/UL — SIGNIFICANT CHANGE UP (ref 1–3.3)
LYMPHOCYTES # BLD AUTO: 14.5 % — SIGNIFICANT CHANGE UP (ref 13–44)
MCHC RBC-ENTMCNC: 30 PG — SIGNIFICANT CHANGE UP (ref 27–34)
MCHC RBC-ENTMCNC: 31.6 GM/DL — LOW (ref 32–36)
MCV RBC AUTO: 95 FL — SIGNIFICANT CHANGE UP (ref 80–100)
MONOCYTES # BLD AUTO: 0.71 K/UL — SIGNIFICANT CHANGE UP (ref 0–0.9)
MONOCYTES NFR BLD AUTO: 9.1 % — SIGNIFICANT CHANGE UP (ref 2–14)
NEUTROPHILS # BLD AUTO: 5.71 K/UL — SIGNIFICANT CHANGE UP (ref 1.8–7.4)
NEUTROPHILS NFR BLD AUTO: 73 % — SIGNIFICANT CHANGE UP (ref 43–77)
NRBC # BLD: 0 /100 WBCS — SIGNIFICANT CHANGE UP (ref 0–0)
PLATELET # BLD AUTO: 204 K/UL — SIGNIFICANT CHANGE UP (ref 150–400)
POTASSIUM SERPL-MCNC: 4.3 MMOL/L — SIGNIFICANT CHANGE UP (ref 3.5–5.3)
POTASSIUM SERPL-SCNC: 4.3 MMOL/L — SIGNIFICANT CHANGE UP (ref 3.5–5.3)
PROT SERPL-MCNC: 9.2 G/DL — HIGH (ref 6–8.3)
PROTHROM AB SERPL-ACNC: 12.9 SEC — SIGNIFICANT CHANGE UP (ref 10.6–13.6)
RBC # BLD: 3.97 M/UL — LOW (ref 4.2–5.8)
RBC # FLD: 14.2 % — SIGNIFICANT CHANGE UP (ref 10.3–14.5)
RH IG SCN BLD-IMP: POSITIVE — SIGNIFICANT CHANGE UP
SODIUM SERPL-SCNC: 135 MMOL/L — SIGNIFICANT CHANGE UP (ref 135–145)
WBC # BLD: 7.82 K/UL — SIGNIFICANT CHANGE UP (ref 3.8–10.5)
WBC # FLD AUTO: 7.82 K/UL — SIGNIFICANT CHANGE UP (ref 3.8–10.5)

## 2021-09-28 PROCEDURE — 37243 VASC EMBOLIZE/OCCLUDE ORGAN: CPT

## 2021-09-28 PROCEDURE — 75726 ARTERY X-RAYS ABDOMEN: CPT | Mod: 26,59

## 2021-09-28 PROCEDURE — 36246 INS CATH ABD/L-EXT ART 2ND: CPT | Mod: 59

## 2021-09-28 PROCEDURE — 93010 ELECTROCARDIOGRAM REPORT: CPT

## 2021-09-28 PROCEDURE — 36247 INS CATH ABD/L-EXT ART 3RD: CPT

## 2021-09-28 RX ORDER — DEXTROSE 50 % IN WATER 50 %
25 SYRINGE (ML) INTRAVENOUS ONCE
Refills: 0 | Status: DISCONTINUED | OUTPATIENT
Start: 2021-09-28 | End: 2021-09-29

## 2021-09-28 RX ORDER — INSULIN LISPRO 100/ML
VIAL (ML) SUBCUTANEOUS ONCE
Refills: 0 | Status: COMPLETED | OUTPATIENT
Start: 2021-09-28 | End: 2021-09-28

## 2021-09-28 RX ORDER — INFLUENZA VIRUS VACCINE 15; 15; 15; 15 UG/.5ML; UG/.5ML; UG/.5ML; UG/.5ML
0.5 SUSPENSION INTRAMUSCULAR ONCE
Refills: 0 | Status: COMPLETED | OUTPATIENT
Start: 2021-09-28 | End: 2021-09-28

## 2021-09-28 RX ORDER — PACLITAXEL 100 MG/20ML
100 INJECTION, POWDER, LYOPHILIZED, FOR SUSPENSION INTRAVENOUS ONCE
Refills: 0 | Status: DISCONTINUED | OUTPATIENT
Start: 2021-09-28 | End: 2021-09-29

## 2021-09-28 RX ORDER — GLUCAGON INJECTION, SOLUTION 0.5 MG/.1ML
1 INJECTION, SOLUTION SUBCUTANEOUS ONCE
Refills: 0 | Status: DISCONTINUED | OUTPATIENT
Start: 2021-09-28 | End: 2021-09-29

## 2021-09-28 RX ORDER — ONDANSETRON 8 MG/1
4 TABLET, FILM COATED ORAL EVERY 4 HOURS
Refills: 0 | Status: DISCONTINUED | OUTPATIENT
Start: 2021-09-28 | End: 2021-09-29

## 2021-09-28 RX ORDER — DEXAMETHASONE 0.5 MG/5ML
16 ELIXIR ORAL ONCE
Refills: 0 | Status: DISCONTINUED | OUTPATIENT
Start: 2021-09-28 | End: 2021-09-29

## 2021-09-28 RX ORDER — SODIUM CHLORIDE 9 MG/ML
1000 INJECTION, SOLUTION INTRAVENOUS
Refills: 0 | Status: DISCONTINUED | OUTPATIENT
Start: 2021-09-28 | End: 2021-09-29

## 2021-09-28 RX ORDER — CEFAZOLIN SODIUM 1 G
1000 VIAL (EA) INJECTION EVERY 8 HOURS
Refills: 0 | Status: COMPLETED | OUTPATIENT
Start: 2021-09-28 | End: 2021-09-29

## 2021-09-28 RX ORDER — GLIMEPIRIDE 1 MG
1 TABLET ORAL
Qty: 0 | Refills: 0 | DISCHARGE

## 2021-09-28 RX ORDER — ONDANSETRON 8 MG/1
16 TABLET, FILM COATED ORAL ONCE
Refills: 0 | Status: DISCONTINUED | OUTPATIENT
Start: 2021-09-28 | End: 2021-09-29

## 2021-09-28 RX ORDER — GEMCITABINE 38 MG/ML
1000 INJECTION, SOLUTION INTRAVENOUS ONCE
Refills: 0 | Status: DISCONTINUED | OUTPATIENT
Start: 2021-09-28 | End: 2021-09-29

## 2021-09-28 RX ORDER — FLUOROURACIL 50 MG/ML
1000 INJECTION, SOLUTION INTRAVENOUS ONCE
Refills: 0 | Status: DISCONTINUED | OUTPATIENT
Start: 2021-09-28 | End: 2021-09-29

## 2021-09-28 RX ORDER — DEXTROSE 50 % IN WATER 50 %
15 SYRINGE (ML) INTRAVENOUS ONCE
Refills: 0 | Status: DISCONTINUED | OUTPATIENT
Start: 2021-09-28 | End: 2021-09-29

## 2021-09-28 RX ORDER — DEXTROSE 50 % IN WATER 50 %
12.5 SYRINGE (ML) INTRAVENOUS ONCE
Refills: 0 | Status: DISCONTINUED | OUTPATIENT
Start: 2021-09-28 | End: 2021-09-29

## 2021-09-28 RX ORDER — INSULIN LISPRO 100/ML
VIAL (ML) SUBCUTANEOUS
Refills: 0 | Status: DISCONTINUED | OUTPATIENT
Start: 2021-09-28 | End: 2021-09-29

## 2021-09-28 RX ORDER — OXALIPLATIN 5 MG/ML
100 INJECTION, SOLUTION INTRAVENOUS ONCE
Refills: 0 | Status: DISCONTINUED | OUTPATIENT
Start: 2021-09-28 | End: 2021-09-29

## 2021-09-28 RX ADMIN — Medication 8: at 22:32

## 2021-09-28 RX ADMIN — Medication 100 MILLIGRAM(S): at 21:31

## 2021-09-28 NOTE — CONSULT NOTE ADULT - SUBJECTIVE AND OBJECTIVE BOX
Malignant neoplasm of pancreatic duct  Other Medical Problems:The patient's toxicity from his prior therapy likely was because the patient was receiving chemotherapy through an infected port.   there isgood substantial evidence from the patient's spouse and the patient that this is what happened.  The patient would like to take the kinder  and gentler route to health by using a regional chemo perfusion of his life­limiting liver metastases, ( this can also be done to the  pancreatic lesion at times) and then we can talk about systemic therapies later on.    the patient was then last week started on capecitabine on a continuous basis as this would not require a deep venous access device and likely would not cause any systemic toxicities.  Reason for Today's Visit:  2021:  History of Present Illness    The patient was in good health until 2015 when he developed symptoms of painless jaundice.  He eventually had a CT scan  and other imaging which demonstrated a cancer in the head of the pancreas.  He was found to be resectable and underwent a Whipple  procedure in that year.  The patient was given adjuvant chemotherapy with gemcitabine.  This was given for a period of 6 months.  The  patient was well until 2 and half years later when he developed a single lesion in the liver.  This lesion was biopsied and ablated.   The  patient was given another 4 months of gemcitabine with Abraxane which have to be stopped after 4 cycles because of taxing pulmonary  toxicity.    The patient continued with the gemcitabine for another 2 months. This lesion was biopsied and ablated and the patient was then  disease free until the present time when multiple hepatic metastases were seen and    He then received 4 months of FOLFIRINOX to which  he had no response.  The patient was then referred to his surgeon.  The patient's surgeon then referred him here for regional  chemotherapy.  Prior to the patient's recent surgery which was to biopsy for molecular testing and for oblation the patient had no pain from  the multiple hepatic metastases.  Past Surgical History    when the patient was 15 he had an appendectomy.  Social History  Patient is . Patient lives with spouse. Non smoker Not applicable Patient reports (on average) 1 drinks per week of alcohol. Denies  any illicit drug use. The patient is a retired .  Family History  the patient's mother  at age 69 from metastatic bladder cancer. The patient's father is 92 years of age and in relatively good health.  Allergies  no known allergies  Medications  Inside Drug Script Date Qty Rfls Instructions  Y capecitabine 500 mg tablet 2021 150 9 3 tablets with breakfast and 2 tablets  with dinner  METFORMIN  MG TABLET 8/3/2020 180 1 take 1 tablet by mouth twice a day  with food  Review Of Systems  Presently the patient feels "really good". The patient had been on the pills capecitabine with every 2 weeks gemcitabine and oxaliplatin.  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 The patient was admitted to the hospital with a blood infection on 1 occasion where they could not find the source of the  infection and the patient responded to the antibiotics and was discharged. After 4 weeks the patient was readmitted with another blood  infection and was felt that the port was the source and it was removed and the patient has been fine ever since. The patient was on an  antibiotic drip for 5 weeks. The patient had a CT scan demonstrating no difference in pre and post treatment size of the cancer. Since the  port was removed 4 weeks ago the patient has not had any chemotherapy nor any evidence for infection. Constitutional: Pt reports no  recent weight loss. Pt reports no loss of appetite. Patient denies night sweats. Fever : No Fever. Chills No Rigors. No Chills. Pt reports  feeling fatigued at times.  Eyes: Pt reports no blurred vision. Pt reports no double vision. Patient denies any changes in vision.   Ears, Nose, Mouth, Throat: Pt reports no hearing loss. Pt reports no ringing in ears. Pt reports no sinus trouble. Pt reports no trouble  swallowing. Pt reports no sore throat. Pt reports no recent episodes of epistaxis. Pt reports no hoarseness.   Cardiac: Pt reports no chest pains. Pt reports no heart palpitations. Pt reports no light headedness. Pt reports no swelling in legs. Pt  reports no episodes of passing out.   Respiratory: Pt reports no cough. Pt reports no sputum production. Pt reports no hemoptysis. Pt reports no shortness of breath. Pt  reports no orthopnea. Pt reports no nocturnal paroxysmal dyspnea.   Gastrointestinal: Pt reports no nausea. Pt reports no vomiting. Pt reports no heartburn. Pt reports no constipation. Pt reports no diarrhea.  Pt reports no rectal bleeding. Pt reports no bowel incontinence.   Genito­Urinary: Pt reports no burning on urination. Pt reports no blood in urine. Pt reports no frequent urination. Pt reports no urinary  incontinence.   MusculoSkeletal: Pt reports no muscle pain. Pt reports no stiffness. Pt reports no joint pains. Patient denies any joint swelling. Pt  reports no back pains.   Skin:Denies any skin rashes.   Neurological: Pt reports no headaches. Pt reports no seizures. Pt reports no dizziness. Pt reports no loss of balance. Pt reports no  weakness of limbs. Pt reports no memory loss. Pt reports no thinking difficulty. Pt reports tingling sensation. Pt reports loss of  sensation.The neuropathic changes are improving progressively.  Psychiatric: Pt reports no nervousness. Pt reports no depression Patient denies restlessness. Patient denies any difficulty sleeping.   Hematologic/Lymphatic/Immunologic: Patient denies bruising Patient denies any bleeding. Pt reports no lumps in arm­pits. Pt reports  no lumps in neck. Pt reports no lumps in groin.  Vitals  Vitals on 2021 12:24:00 PM: Height=75in, Fdqpxl=025ep, Temp=97.3f, Pulse=91, WlfpdrxvSZ=182, DiastolicBP=71, O2 Sat=98%  Physical Exam  Obese.  Skin: Normal. Normal turgor. No rash noted. No lesions.   Eyes: Normal. Eyes with PERRLA. EOM's in­tact Sclera clear,no jaundice noted. Conjunctiva clear.   Ears, Nose,Mouth & Throat: Normal. Teeth normal, no missing teeth or dentures present. No ulcers. No thrush Auditory canals normal.  Oropharynx without lesions. Mucositis None   Neck: Normal. No thyromegaly. No lymphadenopathy noted.   Cardiovascular: Normal. Regular Rate and Rhythm S1, S2 without murmurs. No Extrasystole.   Breast: Not examined.   Chest/Respiratory: Normal. No dyspnea. No rhonchi. No wheezing. No decreased breath sounds at bases. No rales. Unlabored breathing.  No accessory muscle use. Abdomen is soft and non­tender to touch. No hepatomegaly noted. No splenomegaly noted. No abdominal pain  or guarding noted. No abdominal mass(es) appreciated. No ascites noted.   Hematologic/Lymphatic: Normal. No petechiae noted. No purpura noted. No lymphadenopathy noted.   Musculoskeletal: Normal. No Kyphosis. No weakness. No spinal tenderness on percussion. No pain on hip rotation reported by patient.  Normal range of motion in upper and lower extremities.   Neurologic: Normal. Affect normal. No tremors observed. Biceps, brachioradialis, patellar and plantar reflexes symmetrical. Normal  proprioception/position sense Cranial nerves intact.   Psychiatry: Normal. Oriented to person/place/time. No anxiety observed during exam. No signs of depression noted during exam. Intact  short­term and long­term memory.   Extremities: Normal. No clubbing noted to fingers or toes. No cyanosis noted. Capillary refill less than two seconds. No edema. No calf  tenderness reported by patient.

## 2021-09-28 NOTE — BRIEF OPERATIVE NOTE - OPERATION/FINDINGS
Right CFA access obtained with micropuncture technique and 5 Fr sheath under ultrasound and fluoroscopic guidance. SMA cannulated and angiogram showed known replaced right hepatic artery. Half of the total volume of chemotherapy (5FU, Gemcitabine, Oxaliplatin, and Paclitaxel) administered through right hepatic artery. Left hepatic artery cannulated via celiac trunk and the other half of chemotherapy volume infused via L hepatic artery.

## 2021-09-28 NOTE — CONSULT NOTE ADULT - ASSESSMENT
The patient is recovering from sepsis from an infected port which was placed [place] and did not tolerate chemotherapy given by his local oncologist because the port was infected and each time he was infused he became febrile with diarrhea and signs of sepsis.  He has benefited 1 other prior time to Regional chemotherapy which was given because of hepatic insufficiency and extreme hepatic pain from progressive disease in the liver.  He requested additional chemotherapy given regionally because he had no ill affects from the treatment and substantial benefit to his well being.

## 2021-09-28 NOTE — CONSULT NOTE ADULT - TIME BILLING
Regional chemotherapy ( the patient's 2nd regional chemotherapy to the liver ) and per the patient request to repeat the benefits of his last treatment given regionally the patient will receive regional chemotherapy identical to the treatments he received prior to the summer.  The patient did not tolerate NCCN guided treatment with FOLFIRINOX because the irinotecan likely was excessively toxic with the number of metastatic lesions in his  liver and more recently because his port was infected at [facility] and it was used for salvage chemotherapy.

## 2021-09-29 ENCOUNTER — TRANSCRIPTION ENCOUNTER (OUTPATIENT)
Age: 61
End: 2021-09-29

## 2021-09-29 VITALS — TEMPERATURE: 98 F

## 2021-09-29 LAB
A1C WITH ESTIMATED AVERAGE GLUCOSE RESULT: 5.9 % — HIGH (ref 4–5.6)
ALBUMIN SERPL ELPH-MCNC: 3.3 G/DL — SIGNIFICANT CHANGE UP (ref 3.3–5)
ALP SERPL-CCNC: 165 U/L — HIGH (ref 40–120)
ALT FLD-CCNC: 18 U/L — SIGNIFICANT CHANGE UP (ref 10–45)
ANION GAP SERPL CALC-SCNC: 10 MMOL/L — SIGNIFICANT CHANGE UP (ref 5–17)
AST SERPL-CCNC: 25 U/L — SIGNIFICANT CHANGE UP (ref 10–40)
BILIRUB SERPL-MCNC: 0.5 MG/DL — SIGNIFICANT CHANGE UP (ref 0.2–1.2)
BUN SERPL-MCNC: 20 MG/DL — SIGNIFICANT CHANGE UP (ref 7–23)
CALCIUM SERPL-MCNC: 10 MG/DL — SIGNIFICANT CHANGE UP (ref 8.4–10.5)
CHLORIDE SERPL-SCNC: 101 MMOL/L — SIGNIFICANT CHANGE UP (ref 96–108)
CO2 SERPL-SCNC: 23 MMOL/L — SIGNIFICANT CHANGE UP (ref 22–31)
COVID-19 SPIKE DOMAIN AB INTERP: POSITIVE
COVID-19 SPIKE DOMAIN ANTIBODY RESULT: >250 U/ML — HIGH
CREAT SERPL-MCNC: 0.83 MG/DL — SIGNIFICANT CHANGE UP (ref 0.5–1.3)
ESTIMATED AVERAGE GLUCOSE: 123 MG/DL — HIGH (ref 68–114)
GLUCOSE BLDC GLUCOMTR-MCNC: 184 MG/DL — HIGH (ref 70–99)
GLUCOSE SERPL-MCNC: 192 MG/DL — HIGH (ref 70–99)
HCT VFR BLD CALC: 35.4 % — LOW (ref 39–50)
HGB BLD-MCNC: 11.4 G/DL — LOW (ref 13–17)
MCHC RBC-ENTMCNC: 30.1 PG — SIGNIFICANT CHANGE UP (ref 27–34)
MCHC RBC-ENTMCNC: 32.2 GM/DL — SIGNIFICANT CHANGE UP (ref 32–36)
MCV RBC AUTO: 93.4 FL — SIGNIFICANT CHANGE UP (ref 80–100)
NRBC # BLD: 0 /100 WBCS — SIGNIFICANT CHANGE UP (ref 0–0)
PLATELET # BLD AUTO: 186 K/UL — SIGNIFICANT CHANGE UP (ref 150–400)
POTASSIUM SERPL-MCNC: 4.2 MMOL/L — SIGNIFICANT CHANGE UP (ref 3.5–5.3)
POTASSIUM SERPL-SCNC: 4.2 MMOL/L — SIGNIFICANT CHANGE UP (ref 3.5–5.3)
PROT SERPL-MCNC: 8.7 G/DL — HIGH (ref 6–8.3)
RBC # BLD: 3.79 M/UL — LOW (ref 4.2–5.8)
RBC # FLD: 13.8 % — SIGNIFICANT CHANGE UP (ref 10.3–14.5)
SARS-COV-2 IGG+IGM SERPL QL IA: >250 U/ML — HIGH
SARS-COV-2 IGG+IGM SERPL QL IA: POSITIVE
SODIUM SERPL-SCNC: 134 MMOL/L — LOW (ref 135–145)
WBC # BLD: 10.29 K/UL — SIGNIFICANT CHANGE UP (ref 3.8–10.5)
WBC # FLD AUTO: 10.29 K/UL — SIGNIFICANT CHANGE UP (ref 3.8–10.5)

## 2021-09-29 PROCEDURE — 85610 PROTHROMBIN TIME: CPT

## 2021-09-29 PROCEDURE — C1894: CPT

## 2021-09-29 PROCEDURE — 96420 CHEMO IA PUSH TECNIQUE: CPT

## 2021-09-29 PROCEDURE — 85025 COMPLETE CBC W/AUTO DIFF WBC: CPT

## 2021-09-29 PROCEDURE — 85730 THROMBOPLASTIN TIME PARTIAL: CPT

## 2021-09-29 PROCEDURE — 36299 UNLISTED PX VASCULAR NJX: CPT

## 2021-09-29 PROCEDURE — 37243 VASC EMBOLIZE/OCCLUDE ORGAN: CPT

## 2021-09-29 PROCEDURE — 86850 RBC ANTIBODY SCREEN: CPT

## 2021-09-29 PROCEDURE — 80053 COMPREHEN METABOLIC PANEL: CPT

## 2021-09-29 PROCEDURE — 86900 BLOOD TYPING SEROLOGIC ABO: CPT

## 2021-09-29 PROCEDURE — 86901 BLOOD TYPING SEROLOGIC RH(D): CPT

## 2021-09-29 PROCEDURE — 36415 COLL VENOUS BLD VENIPUNCTURE: CPT

## 2021-09-29 PROCEDURE — C1887: CPT

## 2021-09-29 PROCEDURE — 93005 ELECTROCARDIOGRAM TRACING: CPT

## 2021-09-29 PROCEDURE — 83036 HEMOGLOBIN GLYCOSYLATED A1C: CPT

## 2021-09-29 PROCEDURE — 82962 GLUCOSE BLOOD TEST: CPT

## 2021-09-29 PROCEDURE — 86769 SARS-COV-2 COVID-19 ANTIBODY: CPT

## 2021-09-29 PROCEDURE — C1769: CPT

## 2021-09-29 PROCEDURE — 85027 COMPLETE CBC AUTOMATED: CPT

## 2021-09-29 RX ORDER — CEPHALEXIN 500 MG
1 CAPSULE ORAL
Qty: 28 | Refills: 0
Start: 2021-09-29 | End: 2021-10-05

## 2021-09-29 RX ORDER — METFORMIN HYDROCHLORIDE 850 MG/1
1 TABLET ORAL
Qty: 0 | Refills: 0 | DISCHARGE

## 2021-09-29 RX ADMIN — Medication 2: at 06:58

## 2021-09-29 RX ADMIN — Medication 100 MILLIGRAM(S): at 05:11

## 2021-09-29 NOTE — PROGRESS NOTE ADULT - TIME BILLING
Patient to be discharged per recommendations of Dr. Bryant Rudd.  The patient will have his chemotherapy ordered next week when the patient sees me in the office.  The patient will arrange to have chemotherapy administered [facility] near his home by peripheral vein and if necessary have a deep venous access device placed here if there are problems with venous access or the [facility] oncologist refuse to administer the medications through a peripheral vein.

## 2021-09-29 NOTE — DISCHARGE NOTE PROVIDER - HOSPITAL COURSE
61 y/o male, former smoker, w/ PMHx HLD (not on medication), DM, Vitamin D deficiency, and Pancreatic Cancer (s/p Whipple procedure 6 years ago w/ 6 months adjuvant chemotherapy, and microwave ablation 2018) with mets to liver, who presents to [facility] for chemoinfusion of pancreatic cancer mets to liver w/ Dr. Rudd. Patient denies N/V, abd pain, CP, SOB, dizziness, palpitations, syncope, fever, chills recent illness. 9/28/21 s/p hepatic chemoinfusion, 5F R CFA sheath manually compressed. Pt admitted to [facility] overnight for observation. Prophylactic __________ x 7 days.   Pt given appropriate d/c instructions and verbalized understanding. Medications sent to preferred pharmacy. Pt deemed stable for d/c per Dr. Rudd and will f/u with Dr. Santos in 1-2 weeks.        61 y/o male, former smoker, w/ PMHx HLD (not on medication), DM, Vitamin D deficiency, and Pancreatic Cancer (s/p Whipple procedure 6 years ago w/ 6 months adjuvant chemotherapy, and microwave ablation 2018) with mets to liver, who presents to [facility] for chemoinfusion of pancreatic cancer mets to liver w/ Dr. Rudd. Patient denies N/V, abd pain, CP, SOB, dizziness, palpitations, syncope, fever, chills recent illness. Pt is s/p hepatic chemoinfusion on 9/28/21 , 5F R CFA sheath removed and manually compressed. Pt admitted to tele overnight for observation without issues. Prophylactic __________ x 7 days.   Pt given appropriate d/c instructions and verbalized understanding. Medications sent to preferred pharmacy. Pt deemed stable for d/c per Dr. Rudd and will f/u with Dr. Santos in 1-2 weeks.   59 y/o male, former smoker, w/ PMHx HLD (not on medication), DM, Vitamin D deficiency, and Pancreatic Cancer (s/p Whipple procedure 6 years ago w/ 6 months adjuvant chemotherapy, and microwave ablation 2018) with mets to liver, who presents to [facility] for chemoinfusion of pancreatic cancer mets to liver w/ Dr. Rudd. Patient denies N/V, abd pain, CP, SOB, dizziness, palpitations, syncope, fever, chills recent illness. Pt is s/p hepatic chemoinfusion on 9/28/21 , 5F R CFA sheath removed and manually compressed. Pt admitted to [facility] overnight for observation without issues. Prophylactic  Keflex 500 mg QID x 7 days prescribed.    Pt given appropriate d/c instructions and verbalized understanding. Medications sent to preferred pharmacy. Pt deemed stable for d/c per Dr. Rudd and will f/u with Dr. Santos in one week.  Patient cleared for discharge per Dr. Rudd.    61 y/o male, former smoker, w/ PMHx HLD (not on medication), DM, Vitamin D deficiency, and Pancreatic Cancer (s/p Whipple procedure 6 years ago w/ 6 months adjuvant chemotherapy, and microwave ablation 2018) with mets to liver, who presents to [facility] for chemoinfusion of pancreatic cancer mets to liver w/ Dr. Rudd. Patient denies N/V, abd pain, CP, SOB, dizziness, palpitations, syncope, fever, chills recent illness. Pt is s/p hepatic chemoinfusion on 9/28/21 , 5F R CFA sheath removed and manually compressed. Pt admitted to tele overnight for observation without issues. Prophylactic  Keflex 500 mg QID x 7 days prescribed.    Pt given appropriate d/c instructions and verbalized understanding. Medications sent to preferred pharmacy. Pt deemed stable for d/c per Dr. Rudd and will f/u with Dr. Santos in one week.

## 2021-09-29 NOTE — DISCHARGE NOTE NURSING/CASE MANAGEMENT/SOCIAL WORK - PATIENT PORTAL LINK FT
You can access the FollowMyHealth Patient Portal offered by [facility] by registering at the following website: [email or web address]. By joining [employer]’s FollowMyHealth portal, you will also be able to view your health information using other applications (apps) compatible with our system.

## 2021-09-29 NOTE — DISCHARGE NOTE PROVIDER - PROVIDER TOKENS
PROVIDER:[TOKEN:[[id number]],ESTABLISHEDPATIENT:[T]],PROVIDER:[TOKEN:[[id number]],ESTABLISHEDPATIENT:[T]] PROVIDER:[TOKEN:[[id number]],FOLLOWUP:[2 weeks],ESTABLISHEDPATIENT:[T]],PROVIDER:[TOKEN:[[id number]],ESTABLISHEDPATIENT:[T]] PROVIDER:[TOKEN:[[id number]],FOLLOWUP:[1 week],ESTABLISHEDPATIENT:[T]],PROVIDER:[TOKEN:[[id number]],ESTABLISHEDPATIENT:[T]]

## 2021-09-29 NOTE — DISCHARGE NOTE PROVIDER - NSDCMRMEDTOKEN_GEN_ALL_CORE_FT
glimepiride 2 mg oral tablet: 1 tab(s) orally once a day  metFORMIN 500 mg oral tablet: 1 tab(s) orally 2 times a day   glimepiride 2 mg oral tablet: 1 tab(s) orally once a day  Keflex 500 mg oral capsule: 1 cap(s) orally 4 times a day   metFORMIN 500 mg oral tablet: 1 tab(s) orally 2 times a day   glimepiride 2 mg oral tablet: 1 tab(s) orally once a day  Keflex 500 mg oral capsule: 1 cap(s) orally 4 times a day   metFORMIN 500 mg oral tablet: 1 tab(s) orally 2 times a day    *Do NOT take your Metformin for 48 hours---you may restart Friday 10/1/2021

## 2021-09-29 NOTE — PROGRESS NOTE ADULT - ASSESSMENT
The patient is ready for discharge.  The patient will be ready for systemic chemotherapy starting with oral capecitabine and receiving every 2 weeks gemcitabine and oxaliplatin through a peripheral vein as his experience with the deep venous access device was not good.  If the oncologist Abhi will not treat through a peripheral vein the patient will come here to [facility] to have Interventional Radiology place a deep venous access device.

## 2021-09-29 NOTE — PROGRESS NOTE ADULT - SUBJECTIVE AND OBJECTIVE BOX
The patient had no ill effects from Regional chemo perfusion of the common hepatic artery.    Physical Exam  Obese.  Skin: Normal. Normal turgor. No rash noted. No lesions.   Eyes: Normal. Eyes with PERRLA. EOM's in­tact Sclera clear,no jaundice noted. Conjunctiva clear.   Ears, Nose,Mouth & Throat: Normal. Teeth normal, no missing teeth or dentures present. No ulcers. No thrush Auditory canals normal.  Oropharynx without lesions. Mucositis None   Neck: Normal. No thyromegaly. No lymphadenopathy noted.   Cardiovascular: Normal. Regular Rate and Rhythm S1, S2 without murmurs. No Extrasystole.   Breast: Not examined.   Chest/Respiratory: Normal. No dyspnea. No rhonchi. No wheezing. No decreased breath sounds at bases. No rales. Unlabored breathing.  No accessory muscle use. Abdomen is soft and non­tender to touch. No hepatomegaly noted. No splenomegaly noted. No abdominal pain  or guarding noted. No abdominal mass(es) appreciated. No ascites noted.   Hematologic/Lymphatic: Normal. No petechiae noted. No purpura noted. No lymphadenopathy noted.   Musculoskeletal: Normal. No Kyphosis. No weakness. No spinal tenderness on percussion. No pain on hip rotation reported by patient.  Normal range of motion in upper and lower extremities.   Neurologic: Normal. Affect normal. No tremors observed. Biceps, brachioradialis, patellar and plantar reflexes symmetrical. Normal  proprioception/position sense Cranial nerves intact.   Psychiatry: Normal. Oriented to person/place/time. No anxiety observed during exam. No signs of depression noted during exam. Intact  short­term and long­term memory.   Extremities: Normal. No clubbing noted to fingers or toes. No cyanosis noted. Capillary refill less than two seconds. No edema. No calf  tenderness reported by patient.

## 2021-09-29 NOTE — DISCHARGE NOTE PROVIDER - NSDCCPCAREPLAN_GEN_ALL_CORE_FT
PRINCIPAL DISCHARGE DIAGNOSIS  Diagnosis: Pancreatic carcinoma metastatic to liver  Assessment and Plan of Treatment: The catheter from your groin was removed and bleeding was stopped with manual compression.  After 24hours you may take off the dressing and shower. Wash the site with soap and water.  There is no need to put on another bandage.  Avoid tub baths, hot tubs or swimming for 5 days.     Call the Interventional Cardiology at [phone] if any of following occur pertaining to your vascular access site:  Bleeding or hematoma formation (collection of blood under the skin), drainage or redness at the puncture site, numbness, decrease in strength, coolness or pale coloration of skin of the leg or hand.  Please complete the prophylactic _________ x 7 days. Please call to schedule a follow up with Dr Santos in 1-2 weeks.       PRINCIPAL DISCHARGE DIAGNOSIS  Diagnosis: Pancreatic carcinoma metastatic to liver  Assessment and Plan of Treatment: You underwent a hepatic chemoinfusion procedure with Dr Rudd on 9/28/2021.  The catheter from your groin was removed and bleeding was stopped with manual compression.  Wash the site with soap and water.  There is no need to put on another bandage.  Avoid tub baths, hot tubs or swimming for 5 days.     Call the Interventional Cardiology at [phone] if any of following occur pertaining to your vascular access site:  Bleeding or hematoma formation (collection of blood under the skin), drainage or redness at the puncture site, numbness, decrease in strength, coolness or pale coloration of skin of the leg or hand.  Please complete the prophylactic _________ x 7 days. Please call to schedule a follow up with Dr Santos in 1-2 weeks.       PRINCIPAL DISCHARGE DIAGNOSIS  Diagnosis: Pancreatic carcinoma metastatic to liver  Assessment and Plan of Treatment: You underwent a hepatic chemoinfusion procedure with Dr Rudd on 9/28/2021.  The catheter from your groin was removed and bleeding was stopped with manual compression.  Wash the site with soap and water.  There is no need to put on another bandage.  Avoid tub baths, hot tubs or swimming for 5 days.     Call the Interventional Cardiology at [phone] if any of following occur pertaining to your vascular access site:  Bleeding or hematoma formation (collection of blood under the skin), drainage or redness at the puncture site, numbness, decrease in strength, coolness or pale coloration of skin of the leg or hand.  Please complete the prophylactic Keflex 500 mg orally four times a day x 7 days. Please call to schedule a follow up with Dr Santos within a week of discharge.       PRINCIPAL DISCHARGE DIAGNOSIS  Diagnosis: Pancreatic carcinoma metastatic to liver  Assessment and Plan of Treatment: You underwent a hepatic chemoinfusion procedure with Dr Rudd on 9/28/2021.  The catheter from your groin was removed and bleeding was stopped with manual compression.  Wash the site with soap and water.  There is no need to put on another bandage.  Avoid tub baths, hot tubs or swimming for 5 days.     Call the Interventional Cardiology at [phone] if any of following occur pertaining to your vascular access site:  Bleeding or hematoma formation (collection of blood under the skin), drainage or redness at the puncture site, numbness, decrease in strength, coolness or pale coloration of skin of the leg or hand.  Please complete the prophylactic Keflex 500 mg orally four times a day x 7 days. Please call to schedule a follow up with Dr Santos within a week of discharge.      SECONDARY DISCHARGE DIAGNOSES  Diagnosis: Diabetes  Assessment and Plan of Treatment: -DO NOT TAKE your Metformin for two days. You may resume taking your Metformin Friday 10/1/2021  -This medication can interact with the contrast used during your procedure therefore we want to ensure the contrast has left your body prior to you restarting your Metformin.    -Make sure you monitor your finger sticks and diet while you are not taking your Metformin!  -Please make sure you take your Glimepiride 2 mg daily.

## 2021-09-29 NOTE — DISCHARGE NOTE PROVIDER - CARE PROVIDER_API CALL
Babar Santos)  Internal Medicine; Medical Oncology  [address] [place] [address]  Phone: [phone]  Fax: [phone]  Established Patient  Follow Up Time:     Bryant Rudd)  Diagnostic Radiology  [address] [place] [address]  Phone: [phone]  Fax: [phone]  Established Patient  Follow Up Time:    Babar Santos)  Internal Medicine; Medical Oncology  [address] [place] [address]  Phone: [phone]  Fax: [phone]  Established Patient  Follow Up Time: 2 weeks    Bryant Rudd)  Diagnostic Radiology  [address] [place] [address]  Phone: [phone]  Fax: [phone]  Established Patient  Follow Up Time:    Babar Santos)  Internal Medicine; Medical Oncology  [address] [place] [address]  Phone: [phone]  Fax: [phone]  Established Patient  Follow Up Time: 1 week    Bryant Rudd)  Diagnostic Radiology  [address] [place] [address]  Phone: [phone]  Fax: [phone]  Established Patient  Follow Up Time:

## 2021-10-01 RX ORDER — METFORMIN HYDROCHLORIDE 850 MG/1
1 TABLET ORAL
Qty: 0 | Refills: 0 | DISCHARGE
Start: 2021-10-01

## 2021-10-04 DIAGNOSIS — E55.9 VITAMIN D DEFICIENCY, UNSPECIFIED: ICD-10-CM

## 2021-10-04 DIAGNOSIS — E78.5 HYPERLIPIDEMIA, UNSPECIFIED: ICD-10-CM

## 2021-10-04 DIAGNOSIS — E66.9 OBESITY, UNSPECIFIED: ICD-10-CM

## 2021-10-04 DIAGNOSIS — E11.9 TYPE 2 DIABETES MELLITUS WITHOUT COMPLICATIONS: ICD-10-CM

## 2021-10-04 DIAGNOSIS — C78.7 SECONDARY MALIGNANT NEOPLASM OF LIVER AND INTRAHEPATIC BILE DUCT: ICD-10-CM

## 2021-10-04 DIAGNOSIS — C25.9 MALIGNANT NEOPLASM OF PANCREAS, UNSPECIFIED: ICD-10-CM

## 2021-10-04 DIAGNOSIS — Z79.84 LONG TERM (CURRENT) USE OF ORAL HYPOGLYCEMIC DRUGS: ICD-10-CM

## 2021-10-04 DIAGNOSIS — Z87.891 PERSONAL HISTORY OF NICOTINE DEPENDENCE: ICD-10-CM

## 2022-01-19 NOTE — PATIENT PROFILE ADULT. - PSH
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

## 2022-05-20 ENCOUNTER — APPOINTMENT (OUTPATIENT)
Dept: INTERVENTIONAL RADIOLOGY/VASCULAR | Facility: HOSPITAL | Age: 62
End: 2022-05-20
Payer: MEDICARE

## 2022-05-20 DIAGNOSIS — C25.9 MALIGNANT NEOPLASM OF PANCREAS, UNSPECIFIED: ICD-10-CM

## 2022-05-20 DIAGNOSIS — Z80.9 FAMILY HISTORY OF MALIGNANT NEOPLASM, UNSPECIFIED: ICD-10-CM

## 2022-05-20 DIAGNOSIS — Z87.891 PERSONAL HISTORY OF NICOTINE DEPENDENCE: ICD-10-CM

## 2022-05-20 DIAGNOSIS — C78.7 SECONDARY MALIGNANT NEOPLASM OF LIVER AND INTRAHEPATIC BILE DUCT: ICD-10-CM

## 2022-05-20 PROBLEM — Z00.00 ENCOUNTER FOR PREVENTIVE HEALTH EXAMINATION: Status: ACTIVE | Noted: 2022-05-20

## 2022-05-20 PROCEDURE — 99213 OFFICE O/P EST LOW 20 MIN: CPT | Mod: 95

## 2022-05-23 PROBLEM — C25.9: Status: ACTIVE | Noted: 2022-05-23

## 2022-05-23 PROBLEM — Z80.9 FAMILY HISTORY OF MALIGNANT NEOPLASM: Status: ACTIVE | Noted: 2022-05-23

## 2022-05-23 PROBLEM — Z87.891 FORMER SMOKER: Status: ACTIVE | Noted: 2022-05-23

## 2022-05-23 PROBLEM — C78.7 METASTATIC CARCINOMA TO LIVER: Status: ACTIVE | Noted: 2022-05-23

## 2022-05-23 RX ORDER — GLIMEPIRIDE 2 MG/1
2 TABLET ORAL
Refills: 0 | Status: ACTIVE | COMMUNITY

## 2022-05-23 RX ORDER — CAPECITABINE 150 MG/1
150 TABLET, FILM COATED ORAL
Refills: 0 | Status: ACTIVE | COMMUNITY

## 2022-05-23 RX ORDER — OXYCODONE AND ACETAMINOPHEN TABLETS 10; 300 MG/1; MG/1
TABLET ORAL
Refills: 0 | Status: ACTIVE | COMMUNITY

## 2022-05-23 NOTE — ASSESSMENT
[FreeTextEntry1] : 61 year old with metatatic pancreatic cancer liver dominant.  In september 2021 bilirubin was 0.5 now 1.7.  If bilirubin stable can proceed with yttrium 90

## 2022-05-23 NOTE — CONSULT LETTER
[Dear  ___] : Dear  [unfilled], [Consult Letter:] : I had the pleasure of evaluating your patient, [unfilled]. [Please see my note below.] : Please see my note below. [Sincerely,] : Sincerely, [FreeTextEntry3] : Raimundo Bee MD, FSIR\par Chief Interventional Radiology\par [facility]\par [facility]\par

## 2022-05-23 NOTE — REVIEW OF SYSTEMS
[Feeling Tired] : feeling tired [Recent Weight Gain (___ Lbs)] : recent [unfilled] ~Ulb weight gain [Abdominal Pain] : abdominal pain [Negative] : Heme/Lymph [FreeTextEntry2] : 18 pound weight loss past 6 months

## 2022-05-23 NOTE — HISTORY OF PRESENT ILLNESS
[Stable] : stable [Home] : at home, [unfilled] , at the time of the visit. [Medical Office: ([facility])___] : at the medical office located in  [Verbal consent obtained from patient] : the patient, [unfilled] [FreeTextEntry1] : Cordell Martin is a 61 year old man with Stage IV pancreatic cancer first diagnosed in 2015.  Underwent Whipple procedure by Dr Lubin at [facility] followed by adjuvant chemotherapy.  In 2018 a solitary liver metastasis was ablated.  In 202 multiple lesions were identified in the liver and the patient underwent two chemoembolization procedures by  Dr Rudd at [facility].  Has had multiple chemotherapeutic regimens.  Recent CT shows progression of the liver dominant disease and identification of extrahepatic abdominal disease. Most recent bilirubin 1.7 [de-identified] : previous liver ablation [de-identified] : previous chemoembolization

## 2022-06-01 ENCOUNTER — OUTPATIENT (OUTPATIENT)
Dept: OUTPATIENT SERVICES | Facility: HOSPITAL | Age: 62
LOS: 1 days | End: 2022-06-01
Payer: MEDICARE

## 2022-06-01 ENCOUNTER — APPOINTMENT (OUTPATIENT)
Dept: INTERVENTIONAL RADIOLOGY/VASCULAR | Facility: HOSPITAL | Age: 62
End: 2022-06-01

## 2022-06-01 ENCOUNTER — RESULT REVIEW (OUTPATIENT)
Age: 62
End: 2022-06-01

## 2022-06-01 DIAGNOSIS — Z41.9 ENCOUNTER FOR PROCEDURE FOR PURPOSES OTHER THAN REMEDYING HEALTH STATE, UNSPECIFIED: Chronic | ICD-10-CM

## 2022-06-01 DIAGNOSIS — Z98.890 OTHER SPECIFIED POSTPROCEDURAL STATES: Chronic | ICD-10-CM

## 2022-06-01 LAB
ALBUMIN SERPL ELPH-MCNC: 2.4 G/DL — LOW (ref 3.3–5)
ALP SERPL-CCNC: 330 U/L — HIGH (ref 40–120)
ALT FLD-CCNC: 16 U/L — SIGNIFICANT CHANGE UP (ref 10–45)
ANION GAP SERPL CALC-SCNC: 10 MMOL/L — SIGNIFICANT CHANGE UP (ref 5–17)
AST SERPL-CCNC: 61 U/L — HIGH (ref 10–40)
BILIRUB SERPL-MCNC: 1.8 MG/DL — HIGH (ref 0.2–1.2)
BUN SERPL-MCNC: 9 MG/DL — SIGNIFICANT CHANGE UP (ref 7–23)
CALCIUM SERPL-MCNC: 8.6 MG/DL — SIGNIFICANT CHANGE UP (ref 8.4–10.5)
CHLORIDE SERPL-SCNC: 103 MMOL/L — SIGNIFICANT CHANGE UP (ref 96–108)
CO2 SERPL-SCNC: 22 MMOL/L — SIGNIFICANT CHANGE UP (ref 22–31)
CREAT SERPL-MCNC: 0.62 MG/DL — SIGNIFICANT CHANGE UP (ref 0.5–1.3)
EGFR: 108 ML/MIN/1.73M2 — SIGNIFICANT CHANGE UP
GLUCOSE BLDC GLUCOMTR-MCNC: 199 MG/DL — HIGH (ref 70–99)
GLUCOSE SERPL-MCNC: 196 MG/DL — HIGH (ref 70–99)
POTASSIUM SERPL-MCNC: 3.4 MMOL/L — LOW (ref 3.5–5.3)
POTASSIUM SERPL-SCNC: 3.4 MMOL/L — LOW (ref 3.5–5.3)
PROT SERPL-MCNC: 9 G/DL — HIGH (ref 6–8.3)
SODIUM SERPL-SCNC: 135 MMOL/L — SIGNIFICANT CHANGE UP (ref 135–145)

## 2022-06-01 PROCEDURE — 77263 THER RADIOLOGY TX PLNG CPLX: CPT

## 2022-06-01 PROCEDURE — 76380 CAT SCAN FOLLOW-UP STUDY: CPT | Mod: 26,MH,59

## 2022-06-01 PROCEDURE — 36247 INS CATH ABD/L-EXT ART 3RD: CPT

## 2022-06-01 PROCEDURE — 75726 ARTERY X-RAYS ABDOMEN: CPT

## 2022-06-01 PROCEDURE — 77300 RADIATION THERAPY DOSE PLAN: CPT

## 2022-06-01 PROCEDURE — 76380 CAT SCAN FOLLOW-UP STUDY: CPT

## 2022-06-01 PROCEDURE — 36415 COLL VENOUS BLD VENIPUNCTURE: CPT

## 2022-06-01 PROCEDURE — 36248 INS CATH ABD/L-EXT ART ADDL: CPT

## 2022-06-01 PROCEDURE — 78830 RP LOCLZJ TUM SPECT W/CT 1: CPT | Mod: 26

## 2022-06-01 PROCEDURE — 37243 VASC EMBOLIZE/OCCLUDE ORGAN: CPT

## 2022-06-01 PROCEDURE — 77300 RADIATION THERAPY DOSE PLAN: CPT | Mod: 26

## 2022-06-01 PROCEDURE — A9540: CPT

## 2022-06-01 PROCEDURE — 82962 GLUCOSE BLOOD TEST: CPT

## 2022-06-01 PROCEDURE — 78830 RP LOCLZJ TUM SPECT W/CT 1: CPT

## 2022-06-01 PROCEDURE — 75774 ARTERY X-RAY EACH VESSEL: CPT

## 2022-06-01 PROCEDURE — 80053 COMPREHEN METABOLIC PANEL: CPT

## 2022-06-08 ENCOUNTER — RESULT REVIEW (OUTPATIENT)
Age: 62
End: 2022-06-08

## 2022-06-08 ENCOUNTER — OUTPATIENT (OUTPATIENT)
Dept: OUTPATIENT SERVICES | Facility: HOSPITAL | Age: 62
LOS: 1 days | End: 2022-06-08
Payer: MEDICARE

## 2022-06-08 ENCOUNTER — APPOINTMENT (OUTPATIENT)
Dept: INTERVENTIONAL RADIOLOGY/VASCULAR | Facility: HOSPITAL | Age: 62
End: 2022-06-08

## 2022-06-08 DIAGNOSIS — C25.9 MALIGNANT NEOPLASM OF PANCREAS, UNSPECIFIED: ICD-10-CM

## 2022-06-08 DIAGNOSIS — Z98.890 OTHER SPECIFIED POSTPROCEDURAL STATES: Chronic | ICD-10-CM

## 2022-06-08 DIAGNOSIS — Z41.9 ENCOUNTER FOR PROCEDURE FOR PURPOSES OTHER THAN REMEDYING HEALTH STATE, UNSPECIFIED: Chronic | ICD-10-CM

## 2022-06-08 LAB
ALBUMIN SERPL ELPH-MCNC: 2.1 G/DL — LOW (ref 3.3–5)
ALP SERPL-CCNC: 356 U/L — HIGH (ref 40–120)
ALT FLD-CCNC: 16 U/L — SIGNIFICANT CHANGE UP (ref 10–45)
ANION GAP SERPL CALC-SCNC: 12 MMOL/L — SIGNIFICANT CHANGE UP (ref 5–17)
AST SERPL-CCNC: 58 U/L — HIGH (ref 10–40)
BILIRUB DIRECT SERPL-MCNC: 2.8 MG/DL — HIGH (ref 0–0.3)
BILIRUB INDIRECT FLD-MCNC: 1.1 MG/DL — HIGH (ref 0.2–1)
BILIRUB SERPL-MCNC: 3.9 MG/DL — HIGH (ref 0.2–1.2)
BILIRUB SERPL-MCNC: 4.1 MG/DL — HIGH (ref 0.2–1.2)
BUN SERPL-MCNC: 27 MG/DL — HIGH (ref 7–23)
CALCIUM SERPL-MCNC: 9.1 MG/DL — SIGNIFICANT CHANGE UP (ref 8.4–10.5)
CHLORIDE SERPL-SCNC: 98 MMOL/L — SIGNIFICANT CHANGE UP (ref 96–108)
CO2 SERPL-SCNC: 21 MMOL/L — LOW (ref 22–31)
CREAT SERPL-MCNC: 0.89 MG/DL — SIGNIFICANT CHANGE UP (ref 0.5–1.3)
EGFR: 97 ML/MIN/1.73M2 — SIGNIFICANT CHANGE UP
GLUCOSE SERPL-MCNC: 142 MG/DL — HIGH (ref 70–99)
POTASSIUM SERPL-MCNC: 3.5 MMOL/L — SIGNIFICANT CHANGE UP (ref 3.5–5.3)
POTASSIUM SERPL-SCNC: 3.5 MMOL/L — SIGNIFICANT CHANGE UP (ref 3.5–5.3)
PROT SERPL-MCNC: 9 G/DL — HIGH (ref 6–8.3)
SODIUM SERPL-SCNC: 131 MMOL/L — LOW (ref 135–145)

## 2022-06-08 PROCEDURE — 80053 COMPREHEN METABOLIC PANEL: CPT

## 2022-06-08 PROCEDURE — 36415 COLL VENOUS BLD VENIPUNCTURE: CPT

## 2022-06-08 PROCEDURE — 82247 BILIRUBIN TOTAL: CPT

## 2022-06-08 PROCEDURE — 76700 US EXAM ABDOM COMPLETE: CPT | Mod: 26

## 2022-06-08 PROCEDURE — 76700 US EXAM ABDOM COMPLETE: CPT

## 2022-06-08 PROCEDURE — 82248 BILIRUBIN DIRECT: CPT

## 2022-06-23 ENCOUNTER — APPOINTMENT (OUTPATIENT)
Dept: INTERVENTIONAL RADIOLOGY/VASCULAR | Facility: HOSPITAL | Age: 62
End: 2022-06-23

## 2022-07-06 ENCOUNTER — APPOINTMENT (OUTPATIENT)
Dept: INTERVENTIONAL RADIOLOGY/VASCULAR | Facility: HOSPITAL | Age: 62
End: 2022-07-06

## 2022-07-28 ENCOUNTER — APPOINTMENT (OUTPATIENT)
Dept: INTERVENTIONAL RADIOLOGY/VASCULAR | Facility: HOSPITAL | Age: 62
End: 2022-07-28

## 2022-08-29 NOTE — PATIENT PROFILE ADULT. - NS PRO PT RIGHT SUPPORT PERSON
Graft Cartilage Fenestration Text: The cartilage was fenestrated with a 2mm punch biopsy to help facilitate graft survival and healing. Declines

## 2023-06-29 NOTE — PROGRESS NOTE ADULT - TIME BILLING
Discharge today.  The patient will follow up with his oncologist next week to have his blood counts checked. Fluconazole Pregnancy And Lactation Text: This medication is Pregnancy Category C and it isn't know if it is safe during pregnancy. It is also excreted in breast milk.

## 2023-11-24 NOTE — DISCHARGE NOTE PROVIDER - NSDCMRMEDTOKEN_GEN_ALL_CORE_FT
Physical Therapy Discharge Summary    Reason for therapy discharge:    Discharged to home with home therapy.    Progress towards therapy goal(s). See goals on Care Plan in [facility] electronic health record for goal details.  Goals not met.  Barriers to achieving goals:   discharge from facility.    Therapy recommendation(s):    Continued therapy is recommended.  Rationale/Recommendations:  Home PT.       glimepiride 2 mg oral tablet: 1 tab(s) orally once a day  metFORMIN 500 mg oral tablet: 1 tab(s) orally 2 times a day   acetaminophen-oxyCODONE 325 mg-5 mg oral tablet: 1 tab(s) orally every 6 hours, As Needed -for severe pain MDD:8  glimepiride 2 mg oral tablet: 1 tab(s) orally once a day  Keflex 500 mg oral capsule: 1 cap(s) orally 4 times a day   metFORMIN 500 mg oral tablet: 1 tab(s) orally 2 times a day

## 2024-05-21 NOTE — PRE-OP CHECKLIST - HEIGHT IN CM
RN called and advised Shannon Physical Therapist [facility] (HOME CARE) of Dr. Martinez Verbal orders.  Shannon verbalized understanding.    190.5

## 2025-04-08 NOTE — PATIENT PROFILE ADULT - VISION (WITH CORRECTIVE LENSES IF THE PATIENT USUALLY WEARS THEM):
Normal vision: sees adequately in most situations; can see medication labels, newsprint Initiate Treatment: Clarx Wart -Apply to affected areas BID AS NEEDED Detail Level: Zone Render In Strict Bullet Format?: No Plan: Discussed freezing with LN2, but pt declined today as she has had freezing before and prefers to do Clarx wart peel.